# Patient Record
Sex: FEMALE | Race: WHITE | Employment: FULL TIME
[De-identification: names, ages, dates, MRNs, and addresses within clinical notes are randomized per-mention and may not be internally consistent; named-entity substitution may affect disease eponyms.]

---

## 2017-06-10 ENCOUNTER — HEALTH MAINTENANCE LETTER (OUTPATIENT)
Age: 27
End: 2017-06-10

## 2018-01-01 ENCOUNTER — HOSPITAL ENCOUNTER (EMERGENCY)
Facility: CLINIC | Age: 28
Discharge: HOME | End: 2018-01-01
Attending: EMERGENCY MEDICINE | Admitting: EMERGENCY MEDICINE
Payer: COMMERCIAL

## 2018-01-01 VITALS
TEMPERATURE: 98 F | RESPIRATION RATE: 16 BRPM | SYSTOLIC BLOOD PRESSURE: 125 MMHG | OXYGEN SATURATION: 98 % | DIASTOLIC BLOOD PRESSURE: 85 MMHG

## 2018-01-01 DIAGNOSIS — S01.81XA FACIAL LACERATION, INITIAL ENCOUNTER: ICD-10-CM
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
## 2018-01-01 PROCEDURE — 12011 RPR F/E/E/N/L/M 2.5 CM/<: CPT

## 2018-01-01 PROCEDURE — 25000128 H RX IP 250 OP 636: Performed by: EMERGENCY MEDICINE

## 2018-01-01 PROCEDURE — 99283 EMERGENCY DEPT VISIT LOW MDM: CPT | Mod: 25

## 2018-01-01 PROCEDURE — 90715 TDAP VACCINE 7 YRS/> IM: CPT | Performed by: EMERGENCY MEDICINE

## 2018-01-01 PROCEDURE — 90471 IMMUNIZATION ADMIN: CPT

## 2018-01-01 RX ORDER — BUPIVACAINE HYDROCHLORIDE 5 MG/ML
INJECTION, SOLUTION PERINEURAL
Status: DISCONTINUED
Start: 2018-01-01 | End: 2018-01-01 | Stop reason: HOSPADM

## 2018-01-01 RX ADMIN — CLOSTRIDIUM TETANI TOXOID ANTIGEN (FORMALDEHYDE INACTIVATED), CORYNEBACTERIUM DIPHTHERIAE TOXOID ANTIGEN (FORMALDEHYDE INACTIVATED), BORDETELLA PERTUSSIS TOXOID ANTIGEN (GLUTARALDEHYDE INACTIVATED), BORDETELLA PERTUSSIS FILAMENTOUS HEMAGGLUTININ ANTIGEN (FORMALDEHYDE INACTIVATED), BORDETELLA PERTUSSIS PERTACTIN ANTIGEN, AND BORDETELLA PERTUSSIS FIMBRIAE 2/3 ANTIGEN 0.5 ML: 5; 2; 2.5; 5; 3; 5 INJECTION, SUSPENSION INTRAMUSCULAR at 04:12

## 2018-01-01 ASSESSMENT — ENCOUNTER SYMPTOMS: WOUND: 1

## 2018-01-01 NOTE — ED AVS SNAPSHOT
[facility] Emergency Department    [address]    [facility] [address]    Phone:  [phone]    Fax:  [phone]                                       Cristina Sorto   MRN: [id number]    Department:  [facility] Emergency Department   Date of Visit:  1/1/2018           Patient Information     Date Of Birth          1990        Your diagnoses for this visit were:     Facial laceration, initial encounter     Dog bite, initial encounter        You were seen by Alan Barbosa MD.      Follow-up Information     Follow up with Primary doctor in 4-5 days for suture removal.        Discharge Instructions       Discharge Instructions  Laceration (Cut)    You were seen today for a laceration (cut).  Your provider examined your laceration for any problems such a buried foreign body (like glass, a splinter, or gravel), or injury to blood vessels, tendons, and nerves.  Your provider may have also rinsed and/or scrubbed your laceration to help prevent an infection. It may not be possible to find all problems with your laceration on the first visit; occasionally foreign bodies or a tendon injury can go undetected.    Your laceration may have been closed in one of several ways:    No closure: many wounds will heal just fine without closure.    Stitches: regular stitches that require removal.    Staples: skin staples are often used in the scalp/head.    Wound adhesive (glue): skin glue can be used for certain lacerations and doesn t require removal.    Wound strips (aka Butterfly bandages or steri-strips): these are bandages that help to close a wound.    Absorbable stitches:  dissolving  stitches that go away on their own and usually don t require removal.    A small percentage of wounds will develop an infection regardless of how well the wound is cared for. Antibiotics are generally not indicated to prevent an infection so are only given for a small number of high-risk  wounds. Some lacerations are too high risk to close, and are left open to heal because closure can increase the likelihood that an infection will develop.    Remember that all lacerations, no matter how expertly repaired, will cause scarring. We consider many factors, techniques, and materials, in our efforts to provide the best possible cosmetic outcome.    Generally, every Emergency Department visit should have a follow-up clinic visit with either a primary or a specialty clinic/provider. Please follow-up as instructed by your emergency provider today.     Return to the Emergency Department right away if:    You have more redness, swelling, pain, drainage (pus), a bad smell, or red streaking from your laceration as these symptoms could indicate an infection.    You have a fever of 100.4 F or more.    You have bleeding that you cannot stop at home. If your cut starts to bleed, hold pressure on the bleeding area with a clean cloth or put pressure over the bandage.  If the bleeding does not stop after using constant pressure for 30 minutes, you should return to the Emergency Department for further treatment.    An area past the laceration is cool, pale, or blue compared with the other side, or has a slower return of color when squeezed.    Your dressing seems too tight or starts to get uncomfortable or painful. For children, signs of a problem might be irritability or restlessness.    You have loss of normal function or use of an area, such as being unable to straighten or bend a finger normally.    You have a numb area past the laceration.    Return to the Emergency Department or see your regular provider if:    The laceration starts to come open.     You have something coming out of the cut or a feeling that there is something in the laceration.    Your wound will not heal, or keeps breaking open. There can always be glass, wood, dirt or other things in any wound.  They will not always show up, even on x-rays.  If  a wound does not heal, this may be why, and it is important to follow-up with your regular provider.    Home Care:    Take your dressing off in 12-24 hours, or as instructed by your provider, to check your laceration. Remove the dressing sooner if it seems too tight or painful, or if it is getting numb, tingly, or pale past the dressing.    Gently wash your laceration 1-2 times daily with clean water and mild soap. It is okay to shower or run clean water over the laceration, but do not let the laceration soak in water (no swimming).    If your laceration was closed with wound adhesive or strips: pat it dry and leave it open to the air. For all other repairs: after you wash your laceration, or at least 2 times a day, apply antibiotic ointment (such as Neosporin  or Bacitracin ) to the laceration, then cover it with a Band-Aid  or gauze.    Keep the laceration clean. Wear gloves or other protective clothing if you are around dirt.    Follow-up for removal:    If your wound was closed with staples or regular stitches, they need to be removed according to the instructions and timeline specified by your provider today.    If your wound was closed with absorbable ( dissolving ) sutures, they should fall out, dissolve, or not be visible in about one week. If they are still visible, then they should be removed according to the instructions and timeline specified by your provider today.    Scars:  To help minimize scarring:    Wear sunscreen over the healed laceration when out in the sun.    Massage the area regularly once healed.    You may apply Vitamin E to the healed wound.    Wait. Scars improve in appearance over months and years.    If you were given a prescription for medicine here today, be sure to read all of the information (including the package insert) that comes with your prescription.  This will include important information about the medicine, its side effects, and any warnings that you need to know about.   The pharmacist who fills the prescription can provide more information and answer questions you may have about the medicine.  If you have questions or concerns that the pharmacist cannot address, please call or return to the Emergency Department.       Remember that you can always come back to the Emergency Department if you are not able to see your regular provider in the amount of time listed above, if you get any new symptoms, or if there is anything that worries you.        Dog Bite  A dog bite can cause a wound deep enough to break the skin. In such cases, the wound is cleaned and sometimes closed. If the wound is closed, it is usually not completely closed. This is so that fluid can drain if the wound becomes infected. Often, wounds will be left open to heal. In addition to wound care, a tetanus shot may be given, if needed.    Home care    Wash your hands well with soap and warm water before and after caring for the wound. This helps lower the risk of infection.    Care for the wound as directed. If a dressing was applied to the wound, be sure to change it as directed.    If the wound bleeds, place a clean, soft cloth on the wound. Then firmly apply pressure until the bleeding stops. This may take up to 5 minutes. Do not release the pressure and look at the wound during this time.    Most wounds heal within 10 days. But an infection can occur even with proper treatment. So be sure to check the wound daily for signs of infection (see below).    Antibiotics may be prescribed. These help prevent or treat infection. If you re given antibiotics, take them as directed. Also be sure to complete the medicines.  Rabies prevention  Rabies is a virus that can be carried in certain animals. These can include domestic animals such as dogs and cats. Pets fully vaccinated against rabies (2 shots) are at very low risk of infection. But because human rabies is almost always fatal, any biting pet should be confined for 10 days  as an extra precaution. In general, if there is a risk for rabies, the following steps may need to be taken:    If someone s pet dog has bitten you, it should be kept in a secure area for the next 10 days to watch for signs of illness. (If the pet owner won t allow this, contact your local animal control center.) If the dog becomes ill or dies during that time, contact your local animal control center at once so the animal may be tested for rabies. If the dog stays healthy for the next 10 days, there is no danger of rabies in the animal or you.    If a stray dog bit you, contact your local animal control center. They can give information on capture, quarantine, and animal rabies testing.    If you can t find the animal that bit you in the next 2 days, and if rabies exists in your area, you may need to receive the rabies vaccine series. Call your healthcare provider right away. Or, return to the emergency department promptly.    All animal bites should be reported to the local animal control center. If you were not given a form to fill out, you can report this yourself.  Follow-up care  Follow up with your healthcare provider, or as directed.  When to seek medical advice  Call your healthcare provider right away if any of these occur:    Signs of infection:    Spreading redness or warmth from the wound    Increased pain or swelling    Fever of 100.4 F (38 C) or higher, or as directed by your healthcare provider    Colored fluid or pus draining from the wound    Signs of rabies infection:    Headache    Confusion    Strange behavior    Increased salivating and drooling    Seizure    Decreased ability to move any body part near the wound    Bleeding that can't be stopped after 5 minutes of firm pressure  Date Last Reviewed: 3/1/2017    5340-0117 [employer]. [address] [place] [address]. All rights reserved. This information is not intended as a substitute for professional medical care.  Always follow your healthcare professional's instructions.          24 Hour Appointment Hotline       To make an appointment at any [facility], call [phone] ([phone]). If you don't have a family doctor or clinic, we will help you find one. [facility] clinics are conveniently located to serve the needs of you and your family.             Review of your medicines      START taking        Dose / Directions Last dose taken    amoxicillin-clavulanate 875-125 MG per tablet   Commonly known as:  AUGMENTIN   Dose:  1 tablet   Quantity:  10 tablet        Take 1 tablet by mouth 2 times daily for 5 days   Refills:  0          Our records show that you are taking the medicines listed below. If these are incorrect, please call your family doctor or clinic.        Dose / Directions Last dose taken    levonorgestrel-ethinyl estradiol 0.1-20 MG-MCG per tablet   Commonly known as:  AVIANE,ALESSE,LESSINA   Dose:  1 tablet   Quantity:  1 Package        Take 1 tablet by mouth daily   Refills:  0        SUMAtriptan 50 MG tablet   Commonly known as:  IMITREX   Quantity:  30 tablet        1 at start of migraine ,may repeat in 1 hr   Refills:  3                Prescriptions were sent or printed at these locations (1 Prescription)                   Other Prescriptions                Printed at Department/Unit printer (1 of 1)         amoxicillin-clavulanate (AUGMENTIN) 875-125 MG per tablet                Orders Needing Specimen Collection     None      Pending Results     No orders found from 12/30/2017 to 1/2/2018.            Pending Culture Results     No orders found from 12/30/2017 to 1/2/2018.            Pending Results Instructions     If you had any lab results that were not finalized at the time of your Discharge, you can call the ED Lab Result RN at [phone]. You will be contacted by this team for any positive Lab results or changes in treatment. The nurses are available 7 days a week from 10A to 6:30P.  You  can leave a message 24 hours per day and they will return your call.        Test Results From Your Hospital Stay               Clinical Quality Measure: Blood Pressure Screening     Your blood pressure was checked while you were in the emergency department today. The last reading we obtained was  BP: 125/85 . Please read the guidelines below about what these numbers mean and what you should do about them.  If your systolic blood pressure (the top number) is less than 120 and your diastolic blood pressure (the bottom number) is less than 80, then your blood pressure is normal. There is nothing more that you need to do about it.  If your systolic blood pressure (the top number) is 120-139 or your diastolic blood pressure (the bottom number) is 80-89, your blood pressure may be higher than it should be. You should have your blood pressure rechecked within a year by a primary care provider.  If your systolic blood pressure (the top number) is 140 or greater or your diastolic blood pressure (the bottom number) is 90 or greater, you may have high blood pressure. High blood pressure is treatable, but if left untreated over time it can put you at risk for heart attack, stroke, or kidney failure. You should have your blood pressure rechecked by a primary care provider within the next 4 weeks.  If your provider in the emergency department today gave you specific instructions to follow-up with your doctor or provider even sooner than that, you should follow that instruction and not wait for up to 4 weeks for your follow-up visit.        Thank you for choosing [facility]       Thank you for choosing [facility] for your care. Our goal is always to provide you with excellent care. Hearing back from our patients is one way we can continue to improve our services. Please take a few minutes to complete the written survey that you may receive in the mail after you visit with us. Thank you!        [employer] Information     [employer] gives you  secure access to your electronic health record. If you see a primary care provider, you can also send messages to your care team and make appointments. If you have questions, please call your primary care clinic.  If you do not have a primary care provider, please call [phone] and they will assist you.        Care EveryWhere ID     This is your Care EveryWhere ID. This could be used by other organizations to access your [facility] medical records  [id number]        Equal Access to Services     AIDAN CAZARES : Hadii panfilo méndezo Soalejandra, wateeda lusissyadaha, qaybta kaalmaevangelina ladd, av baugh. So [facility] [phone].    ATENCIÓN: Si habla español, tiene a rice disposición servicios gratuitos de asistencia lingüística. Llame al [phone].    We comply with applicable federal civil rights laws and [place] laws. We do not discriminate on the basis of race, color, national origin, age, disability, sex, sexual orientation, or gender identity.            After Visit Summary       This is your record. Keep this with you and show to your community pharmacist(s) and doctor(s) at your next visit.

## 2018-01-01 NOTE — ED NOTES
Patient states she was letting friends dog out when dog bite top lip, bleeding controlled   ABC intact

## 2018-01-01 NOTE — ED PROVIDER NOTES
History     Chief Complaint:  Dog bite    HPI   Cristina Sorto is a 27 year old female who presents with a dog bite. The patient reports that she was bit on the upper lip by a friend's dog approximately two hours ago. Upon arrival here in the ED she denies any dental problems or other injuries aside from some swelling around the laceration.    Allergies:  No Known Drug Allergies     Medications:    Imitrex  Aviane    Past Medical History:    Melanoma in situ    Past Surgical History:    History reviewed. No pertinent past surgical history.    Family History:    Neurologic disorder  Lupus  Melanoma  Arthritis    Social History:  Smoking Status: No  Smokeless Tobacco: No  Alcohol Use: Yes  Marital Status:       Review of Systems   Skin: Positive for wound.   All other systems reviewed and are negative.    Physical Exam   Vitals:  Patient Vitals for the past 24 hrs:   BP Temp Temp src Heart Rate Resp SpO2   01/01/18 0206 125/85 - - - - -   01/01/18 0204 - 98  F (36.7  C) Temporal 91 16 98 %     Physical Exam  Constitutional: Alert, attentive  HENT: 1.5 cm laceration to the upper lip, just right of midline, involving the vermilion border, not through and through; no dental tenderness or laxity   Nose: Nose normal.    Mouth/Throat: Oropharynx is clear, mucous membranes are moist   Eyes: EOM are normal. Pupils are equal, round, and reactive to light.   CV: regular rate and rhythm; no murmurs, rubs or gallups  Chest: Effort normal and breath sounds normal.   GI:  There is no tenderness. No distension. Normal bowel sounds  MSK: Normal range of motion.   Neurological: Alert, attentive  Skin: Skin is warm and dry.    Emergency Department Course     Procedures:     Laceration Repair      LACERATION:  A simple clean 1.5 cm laceration.    LOCATION:  Upper lip.    FUNCTION:  Surrounding sensation and circulation are intact.    ANESTHESIA:  Regional block using bupivacaine 0.5% total of 3 mLs.    PREPARATION:   Irrigation and Scrubbing with Shur Clens.    DEBRIDEMENT:  no debridement.    CLOSURE:  Wound was closed with One Layer.  Skin closed with 4 x 6.0 Ethilon using interrupted sutures.    Interventions:  0412 Adacel, 0.5 mL, Intramuscular     Emergency Department Course:  Nursing notes and vitals reviewed.  0326 I had my initial encounter with the patient.  I performed an exam of the patient as documented above.   0417 I performed the procedure as noted above.    0433 I discussed the treatment plan with the patient. They expressed understanding of this plan and consented to discharge. They will be discharged home with instructions for care and follow up. In addition, the patient will return to the emergency department if their symptoms persist, worsen, if new symptoms arise or if there is any concern.  All questions were answered.    Impression & Plan      Medical Decision Making:  This is a very pleasant 27 year old female who presented with a laceration to the upper lip from a dog bite.  The wound was carefully evaluated and explored.  We discussed the risks of infection given the dog bite, as well as cosmetic concerns of not closing the wound.  After shared decision-making conversation, we opted to close the wound.  The laceration was closed with ethilon as noted herein.  There is no evidence of deeper structure injury with this laceration.  Possible complications (infection, scarring) were reviewed with the patient.  Follow up with primary care will be indicated for suture removal in 4-5 days.  Augmentin prophylaxis will be prescribed.  Tetanus is updated. I advised strict return precautions for pain, redness or drainage to the site, bleeding, or any other concerning symptoms.        Diagnosis:    ICD-10-CM    1. Facial laceration, initial encounter S01.81XA    2. Dog bite, initial encounter W54.0XXA      Disposition:   Discharge    Discharge Medications:  New Prescriptions    AMOXICILLIN-CLAVULANATE (AUGMENTIN)  875-125 MG PER TABLET    Take 1 tablet by mouth 2 times daily for 5 days     Scribe Disclosure:  I, Thomas Berumen, am serving as a scribe at 3:27 AM on 1/1/2018 to document services personally performed by Alan Barbosa MD, based on my observations and the provider's statements to me.    1/1/2018   [facility] EMERGENCY DEPARTMENT       Alan Barbosa MD  01/01/18 0608

## 2018-01-01 NOTE — DISCHARGE INSTRUCTIONS
Discharge Instructions  Laceration (Cut)    You were seen today for a laceration (cut).  Your provider examined your laceration for any problems such a buried foreign body (like glass, a splinter, or gravel), or injury to blood vessels, tendons, and nerves.  Your provider may have also rinsed and/or scrubbed your laceration to help prevent an infection. It may not be possible to find all problems with your laceration on the first visit; occasionally foreign bodies or a tendon injury can go undetected.    Your laceration may have been closed in one of several ways:    No closure: many wounds will heal just fine without closure.    Stitches: regular stitches that require removal.    Staples: skin staples are often used in the scalp/head.    Wound adhesive (glue): skin glue can be used for certain lacerations and doesn t require removal.    Wound strips (aka Butterfly bandages or steri-strips): these are bandages that help to close a wound.    Absorbable stitches:  dissolving  stitches that go away on their own and usually don t require removal.    A small percentage of wounds will develop an infection regardless of how well the wound is cared for. Antibiotics are generally not indicated to prevent an infection so are only given for a small number of high-risk wounds. Some lacerations are too high risk to close, and are left open to heal because closure can increase the likelihood that an infection will develop.    Remember that all lacerations, no matter how expertly repaired, will cause scarring. We consider many factors, techniques, and materials, in our efforts to provide the best possible cosmetic outcome.    Generally, every Emergency Department visit should have a follow-up clinic visit with either a primary or a specialty clinic/provider. Please follow-up as instructed by your emergency provider today.     Return to the Emergency Department right away if:    You have more redness, swelling, pain, drainage  (pus), a bad smell, or red streaking from your laceration as these symptoms could indicate an infection.    You have a fever of 100.4 F or more.    You have bleeding that you cannot stop at home. If your cut starts to bleed, hold pressure on the bleeding area with a clean cloth or put pressure over the bandage.  If the bleeding does not stop after using constant pressure for 30 minutes, you should return to the Emergency Department for further treatment.    An area past the laceration is cool, pale, or blue compared with the other side, or has a slower return of color when squeezed.    Your dressing seems too tight or starts to get uncomfortable or painful. For children, signs of a problem might be irritability or restlessness.    You have loss of normal function or use of an area, such as being unable to straighten or bend a finger normally.    You have a numb area past the laceration.    Return to the Emergency Department or see your regular provider if:    The laceration starts to come open.     You have something coming out of the cut or a feeling that there is something in the laceration.    Your wound will not heal, or keeps breaking open. There can always be glass, wood, dirt or other things in any wound.  They will not always show up, even on x-rays.  If a wound does not heal, this may be why, and it is important to follow-up with your regular provider.    Home Care:    Take your dressing off in 12-24 hours, or as instructed by your provider, to check your laceration. Remove the dressing sooner if it seems too tight or painful, or if it is getting numb, tingly, or pale past the dressing.    Gently wash your laceration 1-2 times daily with clean water and mild soap. It is okay to shower or run clean water over the laceration, but do not let the laceration soak in water (no swimming).    If your laceration was closed with wound adhesive or strips: pat it dry and leave it open to the air. For all other repairs:  after you wash your laceration, or at least 2 times a day, apply antibiotic ointment (such as Neosporin  or Bacitracin ) to the laceration, then cover it with a Band-Aid  or gauze.    Keep the laceration clean. Wear gloves or other protective clothing if you are around dirt.    Follow-up for removal:    If your wound was closed with staples or regular stitches, they need to be removed according to the instructions and timeline specified by your provider today.    If your wound was closed with absorbable ( dissolving ) sutures, they should fall out, dissolve, or not be visible in about one week. If they are still visible, then they should be removed according to the instructions and timeline specified by your provider today.    Scars:  To help minimize scarring:    Wear sunscreen over the healed laceration when out in the sun.    Massage the area regularly once healed.    You may apply Vitamin E to the healed wound.    Wait. Scars improve in appearance over months and years.    If you were given a prescription for medicine here today, be sure to read all of the information (including the package insert) that comes with your prescription.  This will include important information about the medicine, its side effects, and any warnings that you need to know about.  The pharmacist who fills the prescription can provide more information and answer questions you may have about the medicine.  If you have questions or concerns that the pharmacist cannot address, please call or return to the Emergency Department.       Remember that you can always come back to the Emergency Department if you are not able to see your regular provider in the amount of time listed above, if you get any new symptoms, or if there is anything that worries you.        Dog Bite  A dog bite can cause a wound deep enough to break the skin. In such cases, the wound is cleaned and sometimes closed. If the wound is closed, it is usually  not completely closed. This is so that fluid can drain if the wound becomes infected. Often, wounds will be left open to heal. In addition to wound care, a tetanus shot may be given, if needed.    Home care    Wash your hands well with soap and warm water before and after caring for the wound. This helps lower the risk of infection.    Care for the wound as directed. If a dressing was applied to the wound, be sure to change it as directed.    If the wound bleeds, place a clean, soft cloth on the wound. Then firmly apply pressure until the bleeding stops. This may take up to 5 minutes. Do not release the pressure and look at the wound during this time.    Most wounds heal within 10 days. But an infection can occur even with proper treatment. So be sure to check the wound daily for signs of infection (see below).    Antibiotics may be prescribed. These help prevent or treat infection. If you re given antibiotics, take them as directed. Also be sure to complete the medicines.  Rabies prevention  Rabies is a virus that can be carried in certain animals. These can include domestic animals such as dogs and cats. Pets fully vaccinated against rabies (2 shots) are at very low risk of infection. But because human rabies is almost always fatal, any biting pet should be confined for 10 days as an extra precaution. In general, if there is a risk for rabies, the following steps may need to be taken:    If someone s pet dog has bitten you, it should be kept in a secure area for the next 10 days to watch for signs of illness. (If the pet owner won t allow this, contact your local animal control center.) If the dog becomes ill or dies during that time, contact your local animal control center at once so the animal may be tested for rabies. If the dog stays healthy for the next 10 days, there is no danger of rabies in the animal or you.    If a stray dog bit you, contact your local animal control center. They can give information on  capture, quarantine, and animal rabies testing.    If you can t find the animal that bit you in the next 2 days, and if rabies exists in your area, you may need to receive the rabies vaccine series. Call your healthcare provider right away. Or, return to the emergency department promptly.    All animal bites should be reported to the local animal control center. If you were not given a form to fill out, you can report this yourself.  Follow-up care  Follow up with your healthcare provider, or as directed.  When to seek medical advice  Call your healthcare provider right away if any of these occur:    Signs of infection:    Spreading redness or warmth from the wound    Increased pain or swelling    Fever of 100.4 F (38 C) or higher, or as directed by your healthcare provider    Colored fluid or pus draining from the wound    Signs of rabies infection:    Headache    Confusion    Strange behavior    Increased salivating and drooling    Seizure    Decreased ability to move any body part near the wound    Bleeding that can't be stopped after 5 minutes of firm pressure  Date Last Reviewed: 3/1/2017    7364-9562 [employer]. [address] [place] [address], [place] [address]. All rights reserved. This information is not intended as a substitute for professional medical care. Always follow your healthcare professional's instructions.

## 2018-01-01 NOTE — ED AVS SNAPSHOT
[facility] Emergency Department    [address]    [facility] [address]    Phone:  [phone]    Fax:  [phone]                                       Cristina Sorto   MRN: [id number]    Department:  [facility] Emergency Department   Date of Visit:  1/1/2018           After Visit Summary Signature Page     I have received my discharge instructions, and my questions have been answered. I have discussed any challenges I see with this plan with the nurse or doctor.    ..........................................................................................................................................  Patient/Patient Representative Signature      ..........................................................................................................................................  Patient Representative Print Name and Relationship to Patient    ..................................................               ................................................  Date                                            Time    ..........................................................................................................................................  Reviewed by Signature/Title    ...................................................              ..............................................  Date                                                            Time

## 2018-01-05 ENCOUNTER — OFFICE VISIT (OUTPATIENT)
Dept: FAMILY MEDICINE | Facility: CLINIC | Age: 28
End: 2018-01-05
Payer: COMMERCIAL

## 2018-01-05 VITALS
SYSTOLIC BLOOD PRESSURE: 118 MMHG | OXYGEN SATURATION: 100 % | HEIGHT: 67 IN | TEMPERATURE: 98.9 F | DIASTOLIC BLOOD PRESSURE: 78 MMHG | HEART RATE: 78 BPM | WEIGHT: 151 LBS | RESPIRATION RATE: 16 BRPM | BODY MASS INDEX: 23.7 KG/M2

## 2018-01-05 DIAGNOSIS — Z48.02 VISIT FOR SUTURE REMOVAL: Primary | ICD-10-CM

## 2018-01-05 PROCEDURE — 99213 OFFICE O/P EST LOW 20 MIN: CPT | Performed by: FAMILY MEDICINE

## 2018-01-05 NOTE — PROGRESS NOTES
"  SUBJECTIVE:   Cristina Sorto is a 27 year old female who presents to clinic today for the following health issues:        ED/UC Followup:    Facility:  [facility]  Date of visit: 01/01/2018  Reason for visit: Dog bite  Current Status: SR     Cristina Sorto is a 27 year old female who presents today with the need to remove stiches placed in the ED on 1/1/2018 after sustaining an injury to her lip from a dog bite.   She was bitten on the upper lip by a friend's dog.    Problem list and histories reviewed & adjusted, as indicated.  Additional history: as documented    Labs reviewed in EPIC    Reviewed and updated as needed this visit by clinical staff  Tobacco  Allergies  Meds  Med Hx  Surg Hx  Fam Hx  Soc Hx      Reviewed and updated as needed this visit by Provider         ROS:  C: NEGATIVE for fever, chills  INTEGUMENTARY/SKIN: POSITIVE for stiches on upper lip  E: NEGATIVE for vision changes or irritation  E/M: NEGATIVE for ear or throat problems  R: NEGATIVE for significant cough or SOB  CV: NEGATIVE for chest pain, palpitations or peripheral edema  M: NEGATIVE for significant arthralgias or myalgia  H: NEGATIVE for bleeding problems    OBJECTIVE:     /78 (BP Location: Right arm, Patient Position: Sitting, Cuff Size: Adult Regular)  Pulse 78  Temp 98.9  F (37.2  C) (Tympanic)  Resp 16  Ht 5' 7.25\" (1.708 m)  Wt 151 lb (68.5 kg)  LMP 12/12/2017 (Approximate)  SpO2 100%  Breastfeeding? No  BMI 23.47 kg/m2  Body mass index is 23.47 kg/(m^2).   GENERAL: healthy, alert and no distress  EYES: Eyes grossly normal to inspection, PERRL and conjunctivae and sclerae normal  HENT: normal cephalic/atraumatic, ear canals and TM's normal, oropharynx clear and oral mucous membranes moist.   Well-healing vertical laceration (1.5 cm in length with 4 simple interrupted sutures) involving the vermilion border of the upper middle lip.  NECK: no adenopathy, no asymmetry, masses, or scars and thyroid normal to " palpation  RESP: lungs clear to auscultation - no rales, rhonchi or wheezes  CV: regular rate and rhythm, normal S1 S2, no S3 or S4, no murmur, click or rub, no peripheral edema and peripheral pulses strong  MS: no gross musculoskeletal defects noted, no edema  PSYCH: mentation appears normal, affect normal/bright    Diagnostic Test Results:  none     ASSESSMENT/PLAN:     Problem List Items Addressed This Visit     None      Visit Diagnoses     Visit for suture removal    -  Primary         Cristina presents for suture removal of 4 simple-interrupted sutures placed on 1/1/18 after she sustained a dog-bite on her upper lip (through the vermilion border). The wound is well healed without signs of infection.  May use Tylenol/NSAIDs prn.  Discussed wound care.  Recommended to apply Vaseline regularly, avoid wearing make-up near the laceration until it has completely healed and monitor for infection.  The sutures were removed without complication and in the sterile fashion. .  Will have patient return prn.    Sara Stout, DO  [facility]

## 2018-01-05 NOTE — MR AVS SNAPSHOT
After Visit Summary   1/5/2018    Cristina Sorto    MRN: [id number]           Patient Information     Date Of Birth          1990        Visit Information        Provider Department      1/5/2018 1:10 PM Sara Stout DO [facility]        Care Instructions      Laceration: How to Minimize Scarring  A laceration is a cut through one or more layers of the skin. Cuts heal as the edges of the cut grow together and heal. After the cut heals, the skin may not look exactly the same. The desire left behind is called a scar. Scars are a natural part of the healing process. They are hard to avoid. How much you may scar depends on the depth of the cut, its location on your body, your age, and how your skin heals. Some people tend to heal with more scarring than others. Most scars fade and become less noticeable over time. You can take steps to help this process along.  NOTE: Please inform the staff of your last tetanus shot and if your wound was caused by a julianna or dirty object.  What you can do  The tips below can help reduce scarring as your wound heals.    Keep the wound clean. Unless you are told to keep the area dry, gently wash the area with mild soap and water. You don't need to use antibacterial soap.     Keep the wound moist. Apply petroleum jelly to the wound to keep it moist and prevent a scab from forming. Scabs lengthen the time it takes a wound to heal.    Cover the wound. Use a non-adhesive bandage or gauze pad with paper tape. Change the bandage daily or if it gets wet or dirty.    Try hydrating or silicone gel sheets. These dressings keep the wound moist and may help it heal faster with less scarring. They may be useful for larger wounds, scrapes, sores, burns, or wounds with persistent redness. Ask your healthcare provider whether you should use this product on your wound.    If you have stitches (sutures), follow your healthcare provider's  instructions. Care for the wound as instructed. Also, return to have stitches removed on time. If you wait, scarring might be worse.    Use sunscreen. Once the wound heals, apply sunscreen to the area daily. Sun may cause the scar to be more visible and discolored.  Follow up  Make a follow-up appointment as directed by our staff. If you are advised to see a specialist or you have concerns about scarring, please contact a dermatologist.  When to seek medical advice  When to seek medical advice    Call your health care provider right away if any of these occur:    Shaking chills or fever above 100.4 F (38 C)    Bleeding that soaks the dressing    Pink fluid weeping from the wound    Increased drainage from the wound or drainage that is yellow, yellow-green, or has a foul odor    Increased swelling, pain, or redness in the skin around the wound    A change in the color or size of the wound    Increased fatigue    Loss of appetite    Sutures pulling away from the wound or pulling apart  Date Last Reviewed: 11/15/2015    6243-1809 [employer]. [address] [place] [address]. All rights reserved. This information is not intended as a substitute for professional medical care. Always follow your healthcare professional's instructions.                Follow-ups after your visit        Follow-up notes from your care team     Return if symptoms worsen or fail to improve, for stiches.      Who to contact     If you have questions or need follow up information about today's clinic visit or your schedule please contact [facility] directly at [phone].  Normal or non-critical lab and imaging results will be communicated to you by MyChart, letter or phone within 4 business days after the clinic has received the results. If you do not hear from us within 7 days, please contact the clinic through MyChart or phone. If you have a critical or abnormal lab result, we will notify  "you by phone as soon as possible.  Submit refill requests through [employer] or call your pharmacy and they will forward the refill request to us. Please allow 3 business days for your refill to be completed.          Additional Information About Your Visit        [employer] Information     [employer] gives you secure access to your electronic health record. If you see a primary care provider, you can also send messages to your care team and make appointments. If you have questions, please call your primary care clinic.  If you do not have a primary care provider, please call [phone] and they will assist you.        Care EveryWhere ID     This is your Care EveryWhere ID. This could be used by other organizations to access your [place] medical records  [id number]        Your Vitals Were     Pulse Temperature Respirations Height Last Period Pulse Oximetry    78 98.9  F (37.2  C) (Tympanic) 16 5' 7.25\" (1.708 m) 12/12/2017 (Approximate) 100%    Breastfeeding? BMI (Body Mass Index)                No 23.47 kg/m2           Blood Pressure from Last 3 Encounters:   01/05/18 118/78   01/01/18 125/85   09/13/16 110/70    Weight from Last 3 Encounters:   01/05/18 151 lb (68.5 kg)   09/13/16 137 lb (62.1 kg)   11/08/13 158 lb (71.7 kg)              Today, you had the following     No orders found for display       Primary Care Provider Fax #    Physician No Ref-Primary [phone]       No address on file        Equal Access to Services     AIDAN CAZARES : Hadii panfilo [place], waaxda luqadaha, qaybta kaalmada adejasminyada, av pompa . So [facility] [phone].    ATENCIÓN: Si habla español, tiene a rice disposición servicios gratuitos de asistencia lingüística. Llame al [phone].    We comply with applicable federal civil rights laws and [place] laws. We do not discriminate on the basis of race, color, national origin, age, disability, sex, sexual orientation, or gender identity.          "   Thank you!     Thank you for choosing [facility]  for your care. Our goal is always to provide you with excellent care. Hearing back from our patients is one way we can continue to improve our services. Please take a few minutes to complete the written survey that you may receive in the mail after your visit with us. Thank you!             Your Updated Medication List - Protect others around you: Learn how to safely use, store and throw away your medicines at www.disposemymeds.org.          This list is accurate as of: 1/5/18  1:58 PM.  Always use your most recent med list.                   Brand Name Dispense Instructions for use Diagnosis    amoxicillin-clavulanate 875-125 MG per tablet    AUGMENTIN    10 tablet    Take 1 tablet by mouth 2 times daily for 5 days        levonorgestrel-ethinyl estradiol 0.1-20 MG-MCG per tablet    ARIANNA LEE LESSINA    1 Package    Take 1 tablet by mouth daily    Contraception management       SUMAtriptan 50 MG tablet    IMITREX    30 tablet    1 at start of migraine ,may repeat in 1 hr    Migraine with aura and without status migrainosus, not intractable

## 2018-01-05 NOTE — PATIENT INSTRUCTIONS
Laceration: How to Minimize Scarring  A laceration is a cut through one or more layers of the skin. Cuts heal as the edges of the cut grow together and heal. After the cut heals, the skin may not look exactly the same. The desire left behind is called a scar. Scars are a natural part of the healing process. They are hard to avoid. How much you may scar depends on the depth of the cut, its location on your body, your age, and how your skin heals. Some people tend to heal with more scarring than others. Most scars fade and become less noticeable over time. You can take steps to help this process along.  NOTE: Please inform the staff of your last tetanus shot and if your wound was caused by a julianna or dirty object.  What you can do  The tips below can help reduce scarring as your wound heals.    Keep the wound clean. Unless you are told to keep the area dry, gently wash the area with mild soap and water. You don't need to use antibacterial soap.     Keep the wound moist. Apply petroleum jelly to the wound to keep it moist and prevent a scab from forming. Scabs lengthen the time it takes a wound to heal.    Cover the wound. Use a non-adhesive bandage or gauze pad with paper tape. Change the bandage daily or if it gets wet or dirty.    Try hydrating or silicone gel sheets. These dressings keep the wound moist and may help it heal faster with less scarring. They may be useful for larger wounds, scrapes, sores, burns, or wounds with persistent redness. Ask your healthcare provider whether you should use this product on your wound.    If you have stitches (sutures), follow your healthcare provider's instructions. Care for the wound as instructed. Also, return to have stitches removed on time. If you wait, scarring might be worse.    Use sunscreen. Once the wound heals, apply sunscreen to the area daily. Sun may cause the scar to be more visible and discolored.  Follow up  Make a follow-up appointment as directed by our  staff. If you are advised to see a specialist or you have concerns about scarring, please contact a dermatologist.  When to seek medical advice  When to seek medical advice    Call your health care provider right away if any of these occur:    Shaking chills or fever above 100.4 F (38 C)    Bleeding that soaks the dressing    Pink fluid weeping from the wound    Increased drainage from the wound or drainage that is yellow, yellow-green, or has a foul odor    Increased swelling, pain, or redness in the skin around the wound    A change in the color or size of the wound    Increased fatigue    Loss of appetite    Sutures pulling away from the wound or pulling apart  Date Last Reviewed: 11/15/2015    4815-5012 [employer]. [address] [place] [address], [place] [address]. All rights reserved. This information is not intended as a substitute for professional medical care. Always follow your healthcare professional's instructions.

## 2018-03-18 ENCOUNTER — HOSPITAL ENCOUNTER (EMERGENCY)
Facility: CLINIC | Age: 28
Discharge: HOME | End: 2018-03-18
Attending: PHYSICIAN ASSISTANT | Admitting: PHYSICIAN ASSISTANT
Payer: COMMERCIAL

## 2018-03-18 VITALS
HEART RATE: 86 BPM | DIASTOLIC BLOOD PRESSURE: 98 MMHG | HEIGHT: 67 IN | OXYGEN SATURATION: 98 % | RESPIRATION RATE: 16 BRPM | SYSTOLIC BLOOD PRESSURE: 131 MMHG | WEIGHT: 161 LBS | TEMPERATURE: 99.5 F | BODY MASS INDEX: 25.27 KG/M2

## 2018-03-18 DIAGNOSIS — S91.311A FOOT LACERATION, RIGHT, INITIAL ENCOUNTER: ICD-10-CM

## 2018-03-18 PROCEDURE — 12001 RPR S/N/AX/GEN/TRNK 2.5CM/<: CPT

## 2018-03-18 PROCEDURE — 99283 EMERGENCY DEPT VISIT LOW MDM: CPT

## 2018-03-18 ASSESSMENT — ENCOUNTER SYMPTOMS: WOUND: 1

## 2018-03-18 NOTE — ED AVS SNAPSHOT
Emergency Department    [address] [place] [address]    Phone:  [phone]    Fax:  [phone]                                       Cristina Sorto   MRN: [id number]    Department:   Emergency Department   Date of Visit:  3/18/2018           After Visit Summary Signature Page     I have received my discharge instructions, and my questions have been answered. I have discussed any challenges I see with this plan with the nurse or doctor.    ..........................................................................................................................................  Patient/Patient Representative Signature      ..........................................................................................................................................  Patient Representative Print Name and Relationship to Patient    ..................................................               ................................................  Date                                            Time    ..........................................................................................................................................  Reviewed by Signature/Title    ...................................................              ..............................................  Date                                                            Time

## 2018-03-18 NOTE — ED AVS SNAPSHOT
Emergency Department    [address] [facility] [address]    Phone:  [phone]    Fax:  [phone]                                       Cristina Sorto   MRN: [id number]    Department:   Emergency Department   Date of Visit:  3/18/2018           Patient Information     Date Of Birth          1990        Your diagnoses for this visit were:     Foot laceration, right, initial encounter        You were seen by Larissa Berry PA-C.      Follow-up Information     Follow up with No Ref-Primary, Physician In 7 days.    Why:  For suture removal        Follow up with  Emergency Department.    Specialty:  EMERGENCY MEDICINE    Why:  If symptoms worsen    Contact information:    [address] [facility] [address] [phone]        Discharge Instructions       Discharge Instructions  Laceration (Cut)    You were seen today for a laceration (cut).  Your provider examined your laceration for any problems such a buried foreign body (like glass, a splinter, or gravel), or injury to blood vessels, tendons, and nerves.  Your provider may have also rinsed and/or scrubbed your laceration to help prevent an infection. It may not be possible to find all problems with your laceration on the first visit; occasionally foreign bodies or a tendon injury can go undetected.    Your laceration may have been closed in one of several ways:    No closure: many wounds will heal just fine without closure.    Stitches: regular stitches that require removal.    Staples: skin staples are often used in the scalp/head.    Wound adhesive (glue): skin glue can be used for certain lacerations and doesn t require removal.    Wound strips (aka Butterfly bandages or steri-strips): these are bandages that help to close a wound.    Absorbable stitches:  dissolving  stitches that go away on their own and usually don t require removal.    A small percentage of wounds will develop an infection regardless  of how well the wound is cared for. Antibiotics are generally not indicated to prevent an infection so are only given for a small number of high-risk wounds. Some lacerations are too high risk to close, and are left open to heal because closure can increase the likelihood that an infection will develop.    Remember that all lacerations, no matter how expertly repaired, will cause scarring. We consider many factors, techniques, and materials, in our efforts to provide the best possible cosmetic outcome.    Generally, every Emergency Department visit should have a follow-up clinic visit with either a primary or a specialty clinic/provider. Please follow-up as instructed by your emergency provider today.     Return to the Emergency Department right away if:    You have more redness, swelling, pain, drainage (pus), a bad smell, or red streaking from your laceration as these symptoms could indicate an infection.    You have a fever of 100.4 F or more.    You have bleeding that you cannot stop at home. If your cut starts to bleed, hold pressure on the bleeding area with a clean cloth or put pressure over the bandage.  If the bleeding does not stop after using constant pressure for 30 minutes, you should return to the Emergency Department for further treatment.    An area past the laceration is cool, pale, or blue compared with the other side, or has a slower return of color when squeezed.    Your dressing seems too tight or starts to get uncomfortable or painful. For children, signs of a problem might be irritability or restlessness.    You have loss of normal function or use of an area, such as being unable to straighten or bend a finger normally.    You have a numb area past the laceration.    Return to the Emergency Department or see your regular provider if:    The laceration starts to come open.     You have something coming out of the cut or a feeling that there is something in the laceration.    Your wound will not  heal, or keeps breaking open. There can always be glass, wood, dirt or other things in any wound.  They will not always show up, even on x-rays.  If a wound does not heal, this may be why, and it is important to follow-up with your regular provider.    Home Care:    Take your dressing off in 12-24 hours, or as instructed by your provider, to check your laceration. Remove the dressing sooner if it seems too tight or painful, or if it is getting numb, tingly, or pale past the dressing.    Gently wash your laceration 1-2 times daily with clean water and mild soap. It is okay to shower or run clean water over the laceration, but do not let the laceration soak in water (no swimming).    If your laceration was closed with wound adhesive or strips: pat it dry and leave it open to the air. For all other repairs: after you wash your laceration, or at least 2 times a day, apply antibiotic ointment (such as Neosporin  or Bacitracin ) to the laceration, then cover it with a Band-Aid  or gauze.    Keep the laceration clean. Wear gloves or other protective clothing if you are around dirt.    Follow-up for removal:    If your wound was closed with staples or regular stitches, they need to be removed according to the instructions and timeline specified by your provider today.    If your wound was closed with absorbable ( dissolving ) sutures, they should fall out, dissolve, or not be visible in about one week. If they are still visible, then they should be removed according to the instructions and timeline specified by your provider today.    Scars:  To help minimize scarring:    Wear sunscreen over the healed laceration when out in the sun.    Massage the area regularly once healed.    You may apply Vitamin E to the healed wound.    Wait. Scars improve in appearance over months and years.    If you were given a prescription for medicine here today, be sure to read all of the information (including the package insert) that comes  with your prescription.  This will include important information about the medicine, its side effects, and any warnings that you need to know about.  The pharmacist who fills the prescription can provide more information and answer questions you may have about the medicine.  If you have questions or concerns that the pharmacist cannot address, please call or return to the Emergency Department.       Remember that you can always come back to the Emergency Department if you are not able to see your regular provider in the amount of time listed above, if you get any new symptoms, or if there is anything that worries you.      24 Hour Appointment Hotline       To make an appointment at any [facility], call [phone] ([phone]). If you don't have a family doctor or clinic, we will help you find one. [facility] are conveniently located to serve the needs of you and your family.             Review of your medicines      Our records show that you are taking the medicines listed below. If these are incorrect, please call your family doctor or clinic.        Dose / Directions Last dose taken    levonorgestrel-ethinyl estradiol 0.1-20 MG-MCG per tablet   Commonly known as:  AVIANE,ALESSE,LESSINA   Dose:  1 tablet   Quantity:  1 Package        Take 1 tablet by mouth daily   Refills:  0        SUMAtriptan 50 MG tablet   Commonly known as:  IMITREX   Quantity:  30 tablet        1 at start of migraine ,may repeat in 1 hr   Refills:  3                Orders Needing Specimen Collection     None      Pending Results     No orders found from 3/16/2018 to 3/19/2018.            Pending Culture Results     No orders found from 3/16/2018 to 3/19/2018.            Pending Results Instructions     If you had any lab results that were not finalized at the time of your Discharge, you can call the ED Lab Result RN at [phone]. You will be contacted by this team for any positive Lab results or changes in treatment. The  nurses are available 7 days a week from 10A to 6:30P.  You can leave a message 24 hours per day and they will return your call.        Test Results From Your Hospital Stay               Clinical Quality Measure: Blood Pressure Screening     Your blood pressure was checked while you were in the emergency department today. The last reading we obtained was  BP: (!) 131/98 . Please read the guidelines below about what these numbers mean and what you should do about them.  If your systolic blood pressure (the top number) is less than 120 and your diastolic blood pressure (the bottom number) is less than 80, then your blood pressure is normal. There is nothing more that you need to do about it.  If your systolic blood pressure (the top number) is 120-139 or your diastolic blood pressure (the bottom number) is 80-89, your blood pressure may be higher than it should be. You should have your blood pressure rechecked within a year by a primary care provider.  If your systolic blood pressure (the top number) is 140 or greater or your diastolic blood pressure (the bottom number) is 90 or greater, you may have high blood pressure. High blood pressure is treatable, but if left untreated over time it can put you at risk for heart attack, stroke, or kidney failure. You should have your blood pressure rechecked by a primary care provider within the next 4 weeks.  If your provider in the emergency department today gave you specific instructions to follow-up with your doctor or provider even sooner than that, you should follow that instruction and not wait for up to 4 weeks for your follow-up visit.        Thank you for choosing [facility]       Thank you for choosing [facility] for your care. Our goal is always to provide you with excellent care. Hearing back from our patients is one way we can continue to improve our services. Please take a few minutes to complete the written survey that you may receive in the mail after you visit with  us. Thank you!        [employer] Information     "[employer] gives you secure access to your electronic health record. If you see a primary care provider, you can also send messages to your care team and make appointments. If you have questions, please call your primary care clinic.  If you do not have a primary care provider, please call [phone] and they will assist you.        Care EveryWhere ID     This is your Care EveryWhere ID. This could be used by other organizations to access your [facility] medical records  [id number]        Equal Access to Services     AIDAN CAZARES : Vicente edwards Soalejandra, waeric lusissyadaha, qahever kaalmaevangelina ladd, av pompa . So [facility] [phone].    ATENCIÓN: Si habla español, tiene a rice disposición servicios gratuitos de asistencia lingüística. Llame al [phone].    We comply with applicable federal civil rights laws and [place] laws. We do not discriminate on the basis of race, color, national origin, age, disability, sex, sexual orientation, or gender identity.            After Visit Summary       This is your record. Keep this with you and show to your community pharmacist(s) and doctor(s) at your next visit.

## 2018-03-19 NOTE — ED PROVIDER NOTES
"  History     Chief Complaint:  Laceration    The history is provided by the patient.      Cristina Sorto is a 27 year old female who presents for evaluation of a laceration. The patient's mother dropped a steak knife on the patient's right foot approximately 15 minutes ago while they were washing dishes. The patient immediately wrapped her wound in a towel and came to the ED. She has no other injuries.  She has not taken anything for pain.  She has no other medical concerns. Her last Tdap was in January.    Allergies:  No known drug allergies      Medications:    The patient is not currently taking any prescribed medications.     Past Medical History:    Melanoma in situ  Migraines    Past Surgical History:    History reviewed. No pertinent surgical history.     Family History:    Neurologic disorder  Melanoma  Gout  Lupus    Social History:  Presents with mother and spouse   Tobacco use: Never  Alcohol use: Occasional  PCP: Physician No Ref-Primary    Marital Status:        Review of Systems   Skin: Positive for wound.   All other systems reviewed and are negative.    Physical Exam     Patient Vitals for the past 24 hrs:   BP Temp Temp src Pulse Resp SpO2 Height Weight   03/18/18 2004 (!) 131/98 99.5  F (37.5  C) Oral 86 16 98 % 1.702 m (5' 7\") 73 kg (161 lb)      Physical Exam  General: Resting comfortably.  Alert and oriented.   Head:  The scalp, face, and head appear normal   CV:  Regular rate and rhythm     Normal S1/S2    No pathological murmur detected     DP and PT pulses intact.     Capillary refill is brisk.  Resp:  Lungs are clear to auscultation    Non-labored    No rales or wheezing   Skin:  0.3 cm superficial laceration to the dorsal aspect of the right foot with no bleeding currently.  Neuro:  Sensation intact in right foot.    Emergency Department Course   Procedures:     Laceration Repair      LACERATION:  A simple clean 0.3 cm laceration.    LOCATION:  Dorsal right foot.    FUNCTION:  " Distally sensation, circulation, motor and tendon function are intact.    PREPARATION:  Irrigation and Scrubbing with Normal Saline and Shur Clens.    DEBRIDEMENT:  no debridement and wound explored, no foreign body found.    CLOSURE:  Wound was closed with One Layer.  Skin closed with 1 x 5.0 Ethylon using interrupted sutures..       Emergency Department Course:  Past medical records, nursing notes, and vitals reviewed.  2010: I performed an exam of the patient and obtained history, as documented above.      2040: I rechecked the patient. I performed a laceration repair as noted above. Findings and plan explained to the Patient and family. Patient discharged home with instructions regarding supportive care, medications, and reasons to return. The importance of close follow-up was reviewed.      Impression & Plan    Medical Decision Making:  Cristina Sorto is a 27 year old female who presents for evaluation of a laceration.  Patient presents vitally stable and afebrile.  There is a very small superficial laceration noted to the dorsal aspect of the right foot. The wound was carefully evaluated and explored. Given the amount of blood described by the patient and family member and the amount of blood on the patient's foot, I believe the patient may have hit a superficial vein.  There is no evidence of ongoing bleeding.  Laceration was repaired as above.  Patient is up-to-date on her tetanus.  There is no evidence of muscular, tendon, or bony damage with this laceration.  No signs of foreign body. Possible complications (infection, scarring) were reviewed with the patient.  Patient will follow up with her primary care doctor in 7 to 10 days for suture removal.  She will return immediately to the ED if she develops fever, spreading redness, purulent drainage, or any other concerning symptoms.  All questions were answered prior to discharge.  The patient understands and agrees To this plan.    Diagnosis:    ICD-10-CM     1. Foot laceration, right, initial encounter S91.311A        Disposition:  Discharged to home with plan as outlined.      Nato Keith  3/18/2018    EMERGENCY DEPARTMENT  I, Nato Keith, am serving as a scribe at 8:10 PM on 3/18/2018 to document services personally performed by Larissa Berry PA-C based on my observations and the provider's statements to me.       Larissa Berry PA-C  03/18/18 0050

## 2018-06-11 NOTE — NURSING NOTE
"Chief Complaint   Patient presents with     ER F/U     Ridges laceration repair on 01/05/2017.       Initial /78 (BP Location: Right arm, Patient Position: Sitting, Cuff Size: Adult Regular)  Pulse 78  Temp 98.9  F (37.2  C) (Tympanic)  Resp 16  Ht 5' 7.25\" (1.708 m)  Wt 151 lb (68.5 kg)  LMP 12/12/2017 (Approximate)  SpO2 100%  Breastfeeding? No  BMI 23.47 kg/m2 Estimated body mass index is 23.47 kg/(m^2) as calculated from the following:    Height as of this encounter: 5' 7.25\" (1.708 m).    Weight as of this encounter: 151 lb (68.5 kg).  Medication Reconciliation: complete     Tamika Lazo LPN  " Complex Repair And Dorsal Nasal Flap Text: The defect edges were debeveled with a #15 scalpel blade.  The primary defect was closed partially with a complex linear closure.  Given the location of the remaining defect, shape of the defect and the proximity to free margins a dorsal nasal flap was deemed most appropriate for complete closure of the defect.  Using a sterile surgical marker, an appropriate flap was drawn incorporating the defect and placing the expected incisions within the relaxed skin tension lines where possible.    The area thus outlined was incised deep to adipose tissue with a #15 scalpel blade.  The skin margins were undermined to an appropriate distance in all directions utilizing iris scissors.

## 2018-08-06 ENCOUNTER — TELEPHONE (OUTPATIENT)
Dept: FAMILY MEDICINE | Facility: CLINIC | Age: 28
End: 2018-08-06

## 2018-08-06 NOTE — LETTER
August 6, 2018    Cristina Sorto  [address]  [facility] [address]    Dear Janiya Evans cares about your health and your health plan.  I have reviewed your medical conditions, medication list and lab results, and am making recommendations based on this review to better manage your health.    You are in particular need of attention regarding:  -Cervical Cancer Screening  -Wellness (Physical) Visit     I am recommending that you:     -schedule a WELLNESS (Physical) APPOINTMENT with me.   I will check fasting labs the same day - nothing to eat except water and meds for 8-10 hours prior. (If you go elsewhere for Wellness visits then please disregard this reminder.)    -schedule a PAP SMEAR EXAM which is due.  Please disregard this reminder if you have had this exam elsewhere within the last year.  It would be helpful for us to have a copy of your recent pap smear report in our file so that we can best coordinate your care.    If you are under/uninsured, we recommend you contact the [facility]. They offer pap smears at no charge or on a sliding fee charge. You can schedule with them at [phone]. Please have them send us the results.      Please call us at the [facility] location:  [phone] or use [employer] to address the above recommendations.     Thank you for trusting [facility].  We appreciate the opportunity to serve you and look forward to supporting your healthcare in the future.    If you have (or plan to have) any of these tests done at a facility other than a [facility] or a [facility], please have the results sent to the [facility] location noted above.      Best Regards,    Sara Stout, DO

## 2018-08-06 NOTE — TELEPHONE ENCOUNTER
Panel Management Review      Patient has the following on her problem list: None      Composite cancer screening  Chart review shows that this patient is due/due soon for the following Pap Smear  Summary:    Patient is due/failing the following:   PAP and PHYSICAL    Action needed:   Patient needs office visit for Physical/Pap.    Type of outreach:    Sent letter.    Questions for provider review:    None                                                                                                                                    Tamika Lazo LPN     Chart routed to Care Team .

## 2018-11-21 ENCOUNTER — TELEPHONE (OUTPATIENT)
Dept: FAMILY MEDICINE | Facility: CLINIC | Age: 28
End: 2018-11-21

## 2018-11-21 NOTE — LETTER
November 21, 2018    Cristina Sorto  [address]  [facility] [address]    Dear Janiya Evans cares about your health and your health plan.  I have reviewed your medical conditions, medication list and lab results, and am making recommendations based on this review to better manage your health.    You are in particular need of attention regarding:  -Cervical Cancer Screening  -Wellness (Physical) Visit     I am recommending that you:     -schedule a WELLNESS (Physical) APPOINTMENT with me.   I will check fasting labs the same day - nothing to eat except water and meds for 8-10 hours prior. (If you go elsewhere for Wellness visits then please disregard this reminder.)    -schedule a PAP SMEAR EXAM which is due.  Please disregard this reminder if you have had this exam elsewhere within the last year.  It would be helpful for us to have a copy of your recent pap smear report in our file so that we can best coordinate your care.    If you are under/uninsured, we recommend you contact the [facility]. They offer pap smears at no charge or on a sliding fee charge. You can schedule with them at [phone]. Please have them send us the results.      Please call us at the [facility] location:  [phone] or use [employer] to address the above recommendations.     Thank you for trusting [facility].  We appreciate the opportunity to serve you and look forward to supporting your healthcare in the future.    If you have (or plan to have) any of these tests done at a facility other than a [facility] or a [facility], please have the results sent to the [facility] location noted above.      Best Regards,    Sara Stout, DO

## 2018-11-21 NOTE — TELEPHONE ENCOUNTER
Panel Management Review      Patient has the following on her problem list: None      Composite cancer screening  Chart review shows that this patient is due/due soon for the following Pap Smear  Summary:    Patient is due/failing the following:   PAP and PHYSICAL    Action needed:   Patient needs office visit for Physical/Pap.    Type of outreach:    Sent letter.    Questions for provider review:    None                                                                                                                                    Tamika Lazo LPN     Chart routed to  .

## 2019-09-30 ENCOUNTER — HEALTH MAINTENANCE LETTER (OUTPATIENT)
Age: 29
End: 2019-09-30

## 2019-10-16 LAB
BLD GP AB SCN SERPL QL: NEGATIVE
C TRACH DNA SPEC QL PROBE+SIG AMP: NEGATIVE
HBV SURFACE AG SERPL QL IA: NEGATIVE
HIV 1+2 AB+HIV1 P24 AG SERPL QL IA: NEGATIVE
RUBELLA ABY IGG: NORMAL
TREPONEMA ANTIBODIES: NORMAL

## 2020-05-04 LAB — GROUP B STREP PCR: NEGATIVE

## 2020-06-01 DIAGNOSIS — Z34.90 ENCOUNTER FOR PLANNED INDUCTION OF LABOR: Primary | ICD-10-CM

## 2020-06-02 DIAGNOSIS — Z34.90 ENCOUNTER FOR PLANNED INDUCTION OF LABOR: ICD-10-CM

## 2020-06-02 PROCEDURE — 99207 ZZC NO BILLABLE SERVICE THIS VISIT: CPT

## 2020-06-02 PROCEDURE — 99000 SPECIMEN HANDLING OFFICE-LAB: CPT | Performed by: OBSTETRICS & GYNECOLOGY

## 2020-06-02 PROCEDURE — 87635 SARS-COV-2 COVID-19 AMP PRB: CPT | Performed by: OBSTETRICS & GYNECOLOGY

## 2020-06-03 LAB
SARS-COV-2 RNA SPEC QL NAA+PROBE: NOT DETECTED
SPECIMEN SOURCE: NORMAL

## 2020-06-04 NOTE — H&P
"  2020    Cristina Sorto  [id number]            OB Admit History & Physical      Ms. Sorto  is here for scheduled IOL at 41 weeks    GISELE=20 by LMP 19        Estimated body mass index is 25.22 kg/m  as calculated from the following:    Height as of 3/18/18: 1.702 m (5' 7\").    Weight as of 3/18/18: 73 kg (161 lb).  Her prenatal course has been complicated by     1. Fetal macrosomia. EFW at 40w3d on = 4253 g (9lb 6 oz) 98th%ile. AC 37.3cm. Elevated GCT, normal 3 hour GTT    2. Hypothryroidism in pregnancy. S/p Endocrinology consult, started on synthroid 75 mcg.       See prenatal for labs.  neg GBBS, Rubella Immune, RH positive    NIPT normal/neg    Estimated fetal weight= 4300 gm       She is a 29 year old   Her OB history:   OB History    Para Term  AB Living   1 0 0 0 0 0   SAB TAB Ectopic Multiple Live Births   0 0 0 0 0      # Outcome Date GA Lbr Hayedr/2nd Weight Sex Delivery Anes PTL Lv   1 Current                     Past Medical History:   Diagnosis Date     Melanoma in situ (H) 2008    left buttock--excised completely         No past surgical history on file.      Current Outpatient Medications   Medication Sig Dispense Refill     levonorgestrel-ethinyl estradiol (AVIANE,ALESSE,LESSINA) 0.1-20 MG-MCG per tablet Take 1 tablet by mouth daily 1 Package 0     SUMAtriptan (IMITREX) 50 MG tablet 1 at start of migraine ,may repeat in 1 hr 30 tablet 3       Allergies: Patient has no known allergies.      REVIEW OF SYSTEMS:  NEUROLOGIC:  Negative  EYES:  Negative  ENT:  Negative  GI:  Negative  BREAST:  Negative  :  Negative  GYN:  Negative  CV:  Negative  PULMONARY:  Negative  MUSCULOSKELETAL:  Negative  PSYCH:  Negative        Social History     Socioeconomic History     Marital status:      Spouse name: Not on file     Number of children: Not on file     Years of education: Not on file     Highest education level: Not on file   Occupational History     Not on file "   Social Needs     Financial resource strain: Not on file     Food insecurity     Worry: Not on file     Inability: Not on file     Transportation needs     Medical: Not on file     Non-medical: Not on file   Tobacco Use     Smoking status: Never Smoker     Smokeless tobacco: Never Used   Substance and Sexual Activity     Alcohol use: Yes     Comment: occasionally     Drug use: No     Sexual activity: Yes     Partners: Male     Birth control/protection: Condom, Pill   Lifestyle     Physical activity     Days per week: Not on file     Minutes per session: Not on file     Stress: Not on file   Relationships     Social connections     Talks on phone: Not on file     Gets together: Not on file     Attends [employer] service: Not on file     Active member of club or organization: Not on file     Attends meetings of clubs or organizations: Not on file     Relationship status: Not on file     Intimate partner violence     Fear of current or ex partner: Not on file     Emotionally abused: Not on file     Physically abused: Not on file     Forced sexual activity: Not on file   Other Topics Concern     Parent/sibling w/ CABG, MI or angioplasty before 65F 55M? No      Service No     Blood Transfusions No     Caffeine Concern No     Occupational Exposure No     Hobby Hazards No     Sleep Concern No     Stress Concern No     Weight Concern No     Special Diet No     Back Care No     Exercise Yes     Comment: 1-2     Bike Helmet Not Asked     Seat Belt Yes     Self-Exams No   Social History Narrative     Not on file      Family History   Problem Relation Age of Onset     Neurologic Disorder Sister      Melanoma Mother      Arthritis Father         gout     Lupus Sister                  Alert Awake in NAD  HEENT grossly normal  Neck: no lymphadenopathy or thryoidomegaly  Lungs CTAB  Back No spinal or CVAT  Heart RRR  ABD gravid, nontender on exam with vertex palpable  Pelvic:  No fluid noted, no blood noted  Cervix is 0.5  cm / 30% effaced at -3 station  EXT:  no edema or calf tenderness  Neuro:  Grossly intact    Assessment: 28 yo para 0 at 41 weeks presents for postdates induction of labor, suspected fetal macrosomia      Plan:  Cervidil induction     Discussed risks of labor and delivery with expected LGA  including shoulder dystocia with possible  asphyxia or brachial plexus injury, as well as risk of PP hemorrhage.      Jennifer L. Schwab, MD MD  Dept of OB/GYN  2020

## 2020-06-05 ENCOUNTER — HOSPITAL ENCOUNTER (INPATIENT)
Facility: CLINIC | Age: 30
LOS: 4 days | Discharge: HOME HEALTH | End: 2020-06-09
Attending: OBSTETRICS & GYNECOLOGY | Admitting: OBSTETRICS & GYNECOLOGY
Payer: COMMERCIAL

## 2020-06-05 DIAGNOSIS — Z34.90 ENCOUNTER FOR PLANNED INDUCTION OF LABOR: Primary | ICD-10-CM

## 2020-06-05 LAB
ABO + RH BLD: NORMAL
BLD GP AB SCN SERPL QL: NORMAL
BLOOD BANK CMNT PATIENT-IMP: NORMAL
HGB BLD-MCNC: 11.5 G/DL (ref 11.7–15.7)
SPECIMEN EXP DATE BLD: NORMAL
SPECIMEN EXP DATE BLD: NORMAL

## 2020-06-05 PROCEDURE — 10907ZC DRAINAGE OF AMNIOTIC FLUID, THERAPEUTIC FROM PRODUCTS OF CONCEPTION, VIA NATURAL OR ARTIFICIAL OPENING: ICD-10-PCS | Performed by: OBSTETRICS & GYNECOLOGY

## 2020-06-05 PROCEDURE — 86901 BLOOD TYPING SEROLOGIC RH(D): CPT | Performed by: OBSTETRICS & GYNECOLOGY

## 2020-06-05 PROCEDURE — 85018 HEMOGLOBIN: CPT | Performed by: OBSTETRICS & GYNECOLOGY

## 2020-06-05 PROCEDURE — 86900 BLOOD TYPING SEROLOGIC ABO: CPT | Performed by: OBSTETRICS & GYNECOLOGY

## 2020-06-05 PROCEDURE — 25000132 ZZH RX MED GY IP 250 OP 250 PS 637: Performed by: OBSTETRICS & GYNECOLOGY

## 2020-06-05 PROCEDURE — 12000000 ZZH R&B MED SURG/OB

## 2020-06-05 PROCEDURE — 36415 COLL VENOUS BLD VENIPUNCTURE: CPT | Performed by: OBSTETRICS & GYNECOLOGY

## 2020-06-05 PROCEDURE — 86850 RBC ANTIBODY SCREEN: CPT | Performed by: OBSTETRICS & GYNECOLOGY

## 2020-06-05 PROCEDURE — 25800030 ZZH RX IP 258 OP 636: Performed by: OBSTETRICS & GYNECOLOGY

## 2020-06-05 RX ORDER — OXYCODONE AND ACETAMINOPHEN 5; 325 MG/1; MG/1
1 TABLET ORAL
Status: DISCONTINUED | OUTPATIENT
Start: 2020-06-05 | End: 2020-06-09 | Stop reason: HOSPADM

## 2020-06-05 RX ORDER — SODIUM CHLORIDE, SODIUM LACTATE, POTASSIUM CHLORIDE, CALCIUM CHLORIDE 600; 310; 30; 20 MG/100ML; MG/100ML; MG/100ML; MG/100ML
INJECTION, SOLUTION INTRAVENOUS CONTINUOUS
Status: DISCONTINUED | OUTPATIENT
Start: 2020-06-05 | End: 2020-06-09 | Stop reason: HOSPADM

## 2020-06-05 RX ORDER — TERBUTALINE SULFATE 1 MG/ML
0.25 INJECTION, SOLUTION SUBCUTANEOUS
Status: DISCONTINUED | OUTPATIENT
Start: 2020-06-05 | End: 2020-06-09 | Stop reason: HOSPADM

## 2020-06-05 RX ORDER — LEVOTHYROXINE SODIUM 88 UG/1
112 TABLET ORAL
COMMUNITY
Start: 2020-05-14

## 2020-06-05 RX ORDER — VITAMIN A ACETATE, .BETA.-CAROTENE, ASCORBIC ACID, CHOLECALCIFEROL, .ALPHA.-TOCOPHEROL ACETATE, DL-, THIAMINE MONONITRATE, RIBOFLAVIN, NIACINAMIDE, PYRIDOXINE HYDROCHLORIDE, FOLIC ACID, CYANOCOBALAMIN, CALCIUM CARBONATE, FERROUS FUMARATE, ZINC OXIDE, AND CUPRIC OXIDE 2000; 2000; 120; 400; 22; 1.84; 3; 20; 10; 1; 12; 200; 27; 25; 2 [IU]/1; [IU]/1; MG/1; [IU]/1; MG/1; MG/1; MG/1; MG/1; MG/1; MG/1; UG/1; MG/1; MG/1; MG/1; MG/1
1 TABLET ORAL DAILY
COMMUNITY

## 2020-06-05 RX ORDER — METHYLERGONOVINE MALEATE 0.2 MG/ML
200 INJECTION INTRAVENOUS
Status: DISCONTINUED | OUTPATIENT
Start: 2020-06-05 | End: 2020-06-09 | Stop reason: HOSPADM

## 2020-06-05 RX ORDER — TRANEXAMIC ACID 10 MG/ML
1 INJECTION, SOLUTION INTRAVENOUS EVERY 30 MIN PRN
Status: DISCONTINUED | OUTPATIENT
Start: 2020-06-05 | End: 2020-06-09 | Stop reason: HOSPADM

## 2020-06-05 RX ORDER — HYDROXYZINE HYDROCHLORIDE 50 MG/1
100 TABLET, FILM COATED ORAL
Status: DISCONTINUED | OUTPATIENT
Start: 2020-06-05 | End: 2020-06-09 | Stop reason: HOSPADM

## 2020-06-05 RX ORDER — ACETAMINOPHEN 325 MG/1
650 TABLET ORAL EVERY 4 HOURS PRN
Status: DISCONTINUED | OUTPATIENT
Start: 2020-06-05 | End: 2020-06-09 | Stop reason: HOSPADM

## 2020-06-05 RX ORDER — OXYTOCIN 10 [USP'U]/ML
10 INJECTION, SOLUTION INTRAMUSCULAR; INTRAVENOUS
Status: DISCONTINUED | OUTPATIENT
Start: 2020-06-05 | End: 2020-06-09 | Stop reason: HOSPADM

## 2020-06-05 RX ORDER — FENTANYL CITRATE 50 UG/ML
50-100 INJECTION, SOLUTION INTRAMUSCULAR; INTRAVENOUS
Status: DISCONTINUED | OUTPATIENT
Start: 2020-06-05 | End: 2020-06-09 | Stop reason: HOSPADM

## 2020-06-05 RX ORDER — IBUPROFEN 400 MG/1
800 TABLET, FILM COATED ORAL
Status: DISCONTINUED | OUTPATIENT
Start: 2020-06-05 | End: 2020-06-09 | Stop reason: HOSPADM

## 2020-06-05 RX ORDER — ONDANSETRON 2 MG/ML
4 INJECTION INTRAMUSCULAR; INTRAVENOUS EVERY 6 HOURS PRN
Status: DISCONTINUED | OUTPATIENT
Start: 2020-06-05 | End: 2020-06-09 | Stop reason: HOSPADM

## 2020-06-05 RX ORDER — OXYTOCIN/0.9 % SODIUM CHLORIDE 30/500 ML
100-340 PLASTIC BAG, INJECTION (ML) INTRAVENOUS CONTINUOUS PRN
Status: COMPLETED | OUTPATIENT
Start: 2020-06-05 | End: 2020-06-07

## 2020-06-05 RX ORDER — NALOXONE HYDROCHLORIDE 0.4 MG/ML
.1-.4 INJECTION, SOLUTION INTRAMUSCULAR; INTRAVENOUS; SUBCUTANEOUS
Status: DISCONTINUED | OUTPATIENT
Start: 2020-06-05 | End: 2020-06-09 | Stop reason: HOSPADM

## 2020-06-05 RX ORDER — CARBOPROST TROMETHAMINE 250 UG/ML
250 INJECTION, SOLUTION INTRAMUSCULAR
Status: DISCONTINUED | OUTPATIENT
Start: 2020-06-05 | End: 2020-06-09 | Stop reason: HOSPADM

## 2020-06-05 RX ORDER — LEVOTHYROXINE SODIUM 88 UG/1
88 TABLET ORAL
Status: DISCONTINUED | OUTPATIENT
Start: 2020-06-06 | End: 2020-06-09 | Stop reason: HOSPADM

## 2020-06-05 RX ADMIN — HYDROXYZINE HYDROCHLORIDE 100 MG: 50 TABLET, FILM COATED ORAL at 23:11

## 2020-06-05 RX ADMIN — DINOPROSTONE 10 MG: 10 INSERT VAGINAL at 20:52

## 2020-06-05 RX ADMIN — SODIUM CHLORIDE, POTASSIUM CHLORIDE, SODIUM LACTATE AND CALCIUM CHLORIDE: 600; 310; 30; 20 INJECTION, SOLUTION INTRAVENOUS at 21:34

## 2020-06-05 ASSESSMENT — MIFFLIN-ST. JEOR: SCORE: 1705.18

## 2020-06-06 ENCOUNTER — ANESTHESIA (OUTPATIENT)
Dept: OBGYN | Facility: CLINIC | Age: 30
End: 2020-06-06
Payer: COMMERCIAL

## 2020-06-06 ENCOUNTER — ANESTHESIA EVENT (OUTPATIENT)
Dept: OBGYN | Facility: CLINIC | Age: 30
End: 2020-06-06
Payer: COMMERCIAL

## 2020-06-06 LAB — HGB BLD-MCNC: 13.3 G/DL (ref 11.7–15.7)

## 2020-06-06 PROCEDURE — 12000000 ZZH R&B MED SURG/OB

## 2020-06-06 PROCEDURE — 37000008 ZZH ANESTHESIA TECHNICAL FEE, 1ST 30 MIN: Performed by: OBSTETRICS & GYNECOLOGY

## 2020-06-06 PROCEDURE — 25000566 ZZH SEVOFLURANE, EA 15 MIN: Performed by: OBSTETRICS & GYNECOLOGY

## 2020-06-06 PROCEDURE — 25000132 ZZH RX MED GY IP 250 OP 250 PS 637: Performed by: OBSTETRICS & GYNECOLOGY

## 2020-06-06 PROCEDURE — 71000013 ZZH RECOVERY PHASE 1 LEVEL 1 EA ADDTL HR: Performed by: OBSTETRICS & GYNECOLOGY

## 2020-06-06 PROCEDURE — 25000128 H RX IP 250 OP 636: Performed by: OBSTETRICS & GYNECOLOGY

## 2020-06-06 PROCEDURE — 25000128 H RX IP 250 OP 636: Performed by: ANESTHESIOLOGY

## 2020-06-06 PROCEDURE — 3E0P7VZ INTRODUCTION OF HORMONE INTO FEMALE REPRODUCTIVE, VIA NATURAL OR ARTIFICIAL OPENING: ICD-10-PCS | Performed by: OBSTETRICS & GYNECOLOGY

## 2020-06-06 PROCEDURE — 37000009 ZZH ANESTHESIA TECHNICAL FEE, EACH ADDTL 15 MIN: Performed by: OBSTETRICS & GYNECOLOGY

## 2020-06-06 PROCEDURE — 86780 TREPONEMA PALLIDUM: CPT | Performed by: OBSTETRICS & GYNECOLOGY

## 2020-06-06 PROCEDURE — 25000125 ZZHC RX 250: Performed by: NURSE ANESTHETIST, CERTIFIED REGISTERED

## 2020-06-06 PROCEDURE — 3E0R3BZ INTRODUCTION OF ANESTHETIC AGENT INTO SPINAL CANAL, PERCUTANEOUS APPROACH: ICD-10-PCS | Performed by: ANESTHESIOLOGY

## 2020-06-06 PROCEDURE — 25000125 ZZHC RX 250: Performed by: OBSTETRICS & GYNECOLOGY

## 2020-06-06 PROCEDURE — 25000128 H RX IP 250 OP 636: Performed by: NURSE ANESTHETIST, CERTIFIED REGISTERED

## 2020-06-06 PROCEDURE — 86900 BLOOD TYPING SEROLOGIC ABO: CPT | Performed by: OBSTETRICS & GYNECOLOGY

## 2020-06-06 PROCEDURE — 37000011 ZZH ANESTHESIA WARD SERVICE

## 2020-06-06 PROCEDURE — 85018 HEMOGLOBIN: CPT | Performed by: OBSTETRICS & GYNECOLOGY

## 2020-06-06 PROCEDURE — 25800030 ZZH RX IP 258 OP 636: Performed by: OBSTETRICS & GYNECOLOGY

## 2020-06-06 PROCEDURE — 36415 COLL VENOUS BLD VENIPUNCTURE: CPT | Performed by: OBSTETRICS & GYNECOLOGY

## 2020-06-06 PROCEDURE — 36000056 ZZH SURGERY LEVEL 3 1ST 30 MIN: Performed by: OBSTETRICS & GYNECOLOGY

## 2020-06-06 PROCEDURE — 00HU33Z INSERTION OF INFUSION DEVICE INTO SPINAL CANAL, PERCUTANEOUS APPROACH: ICD-10-PCS | Performed by: ANESTHESIOLOGY

## 2020-06-06 PROCEDURE — 71000012 ZZH RECOVERY PHASE 1 LEVEL 1 FIRST HR: Performed by: OBSTETRICS & GYNECOLOGY

## 2020-06-06 PROCEDURE — 36000058 ZZH SURGERY LEVEL 3 EA 15 ADDTL MIN: Performed by: OBSTETRICS & GYNECOLOGY

## 2020-06-06 PROCEDURE — 25000125 ZZHC RX 250: Performed by: ANESTHESIOLOGY

## 2020-06-06 PROCEDURE — 27210794 ZZH OR GENERAL SUPPLY STERILE: Performed by: OBSTETRICS & GYNECOLOGY

## 2020-06-06 RX ORDER — SCOLOPAMINE TRANSDERMAL SYSTEM 1 MG/1
1 PATCH, EXTENDED RELEASE TRANSDERMAL ONCE
Status: DISCONTINUED | OUTPATIENT
Start: 2020-06-06 | End: 2020-06-09 | Stop reason: HOSPADM

## 2020-06-06 RX ORDER — NALOXONE HYDROCHLORIDE 0.4 MG/ML
.1-.4 INJECTION, SOLUTION INTRAMUSCULAR; INTRAVENOUS; SUBCUTANEOUS
Status: DISCONTINUED | OUTPATIENT
Start: 2020-06-06 | End: 2020-06-09 | Stop reason: HOSPADM

## 2020-06-06 RX ORDER — ROPIVACAINE HYDROCHLORIDE 2 MG/ML
INJECTION, SOLUTION EPIDURAL; INFILTRATION; PERINEURAL PRN
OUTPATIENT
Start: 2020-06-06

## 2020-06-06 RX ORDER — ONDANSETRON 4 MG/1
4 TABLET, ORALLY DISINTEGRATING ORAL EVERY 6 HOURS PRN
Status: DISCONTINUED | OUTPATIENT
Start: 2020-06-06 | End: 2020-06-09 | Stop reason: HOSPADM

## 2020-06-06 RX ORDER — LIDOCAINE HYDROCHLORIDE AND EPINEPHRINE BITARTRATE 20; .01 MG/ML; MG/ML
INJECTION, SOLUTION SUBCUTANEOUS PRN
Status: DISCONTINUED | OUTPATIENT
Start: 2020-06-06 | End: 2020-06-07

## 2020-06-06 RX ORDER — CITRIC ACID/SODIUM CITRATE 334-500MG
30 SOLUTION, ORAL ORAL
Status: DISCONTINUED | OUTPATIENT
Start: 2020-06-06 | End: 2020-06-07 | Stop reason: HOSPADM

## 2020-06-06 RX ORDER — ONDANSETRON 2 MG/ML
INJECTION INTRAMUSCULAR; INTRAVENOUS PRN
Status: DISCONTINUED | OUTPATIENT
Start: 2020-06-06 | End: 2020-06-07

## 2020-06-06 RX ORDER — SODIUM CHLORIDE, SODIUM LACTATE, POTASSIUM CHLORIDE, CALCIUM CHLORIDE 600; 310; 30; 20 MG/100ML; MG/100ML; MG/100ML; MG/100ML
INJECTION, SOLUTION INTRAVENOUS CONTINUOUS
Status: DISCONTINUED | OUTPATIENT
Start: 2020-06-06 | End: 2020-06-07 | Stop reason: HOSPADM

## 2020-06-06 RX ORDER — LIDOCAINE HYDROCHLORIDE AND EPINEPHRINE 15; 5 MG/ML; UG/ML
INJECTION, SOLUTION EPIDURAL PRN
OUTPATIENT
Start: 2020-06-06

## 2020-06-06 RX ORDER — EPHEDRINE SULFATE 50 MG/ML
5 INJECTION, SOLUTION INTRAMUSCULAR; INTRAVENOUS; SUBCUTANEOUS
Status: DISCONTINUED | OUTPATIENT
Start: 2020-06-06 | End: 2020-06-09 | Stop reason: HOSPADM

## 2020-06-06 RX ORDER — MORPHINE SULFATE 1 MG/ML
INJECTION, SOLUTION EPIDURAL; INTRATHECAL; INTRAVENOUS PRN
Status: DISCONTINUED | OUTPATIENT
Start: 2020-06-06 | End: 2020-06-07

## 2020-06-06 RX ORDER — CEFAZOLIN SODIUM 1 G/3ML
1 INJECTION, POWDER, FOR SOLUTION INTRAMUSCULAR; INTRAVENOUS SEE ADMIN INSTRUCTIONS
Status: DISCONTINUED | OUTPATIENT
Start: 2020-06-06 | End: 2020-06-07 | Stop reason: HOSPADM

## 2020-06-06 RX ORDER — FENTANYL CITRATE 50 UG/ML
100 INJECTION, SOLUTION INTRAMUSCULAR; INTRAVENOUS ONCE
Status: DISCONTINUED | OUTPATIENT
Start: 2020-06-06 | End: 2020-06-09 | Stop reason: HOSPADM

## 2020-06-06 RX ORDER — FENTANYL CITRATE 50 UG/ML
INJECTION, SOLUTION INTRAMUSCULAR; INTRAVENOUS PRN
OUTPATIENT
Start: 2020-06-06

## 2020-06-06 RX ORDER — ONDANSETRON 2 MG/ML
4 INJECTION INTRAMUSCULAR; INTRAVENOUS EVERY 6 HOURS PRN
Status: DISCONTINUED | OUTPATIENT
Start: 2020-06-06 | End: 2020-06-09 | Stop reason: HOSPADM

## 2020-06-06 RX ORDER — AZITHROMYCIN 500 MG/1
500 INJECTION, POWDER, LYOPHILIZED, FOR SOLUTION INTRAVENOUS
Status: COMPLETED | OUTPATIENT
Start: 2020-06-06 | End: 2020-06-06

## 2020-06-06 RX ORDER — LIDOCAINE 40 MG/G
CREAM TOPICAL
Status: DISCONTINUED | OUTPATIENT
Start: 2020-06-06 | End: 2020-06-09 | Stop reason: HOSPADM

## 2020-06-06 RX ORDER — CEFAZOLIN SODIUM 2 G/100ML
2 INJECTION, SOLUTION INTRAVENOUS
Status: COMPLETED | OUTPATIENT
Start: 2020-06-06 | End: 2020-06-06

## 2020-06-06 RX ORDER — ROPIVACAINE HYDROCHLORIDE 2 MG/ML
10 INJECTION, SOLUTION EPIDURAL; INFILTRATION; PERINEURAL ONCE
Status: DISCONTINUED | OUTPATIENT
Start: 2020-06-06 | End: 2020-06-09 | Stop reason: HOSPADM

## 2020-06-06 RX ORDER — OXYTOCIN/0.9 % SODIUM CHLORIDE 30/500 ML
1-24 PLASTIC BAG, INJECTION (ML) INTRAVENOUS CONTINUOUS
Status: DISCONTINUED | OUTPATIENT
Start: 2020-06-06 | End: 2020-06-09 | Stop reason: HOSPADM

## 2020-06-06 RX ORDER — ROPIVACAINE HYDROCHLORIDE 2 MG/ML
INJECTION, SOLUTION EPIDURAL; INFILTRATION; PERINEURAL
Status: COMPLETED
Start: 2020-06-06 | End: 2020-06-06

## 2020-06-06 RX ORDER — NALBUPHINE HYDROCHLORIDE 10 MG/ML
2.5-5 INJECTION, SOLUTION INTRAMUSCULAR; INTRAVENOUS; SUBCUTANEOUS EVERY 6 HOURS PRN
Status: DISCONTINUED | OUTPATIENT
Start: 2020-06-06 | End: 2020-06-09 | Stop reason: HOSPADM

## 2020-06-06 RX ADMIN — LIDOCAINE HYDROCHLORIDE AND EPINEPHRINE BITARTRATE 10 ML: 20; .01 INJECTION, SOLUTION SUBCUTANEOUS at 23:02

## 2020-06-06 RX ADMIN — ROPIVACAINE HYDROCHLORIDE 5 ML: 2 INJECTION, SOLUTION EPIDURAL; INFILTRATION at 09:41

## 2020-06-06 RX ADMIN — SODIUM CHLORIDE, POTASSIUM CHLORIDE, SODIUM LACTATE AND CALCIUM CHLORIDE: 600; 310; 30; 20 INJECTION, SOLUTION INTRAVENOUS at 23:55

## 2020-06-06 RX ADMIN — LIDOCAINE HYDROCHLORIDE AND EPINEPHRINE BITARTRATE 5 ML: 20; .01 INJECTION, SOLUTION SUBCUTANEOUS at 23:13

## 2020-06-06 RX ADMIN — CEFAZOLIN SODIUM 2 G: 2 INJECTION, SOLUTION INTRAVENOUS at 23:08

## 2020-06-06 RX ADMIN — ONDANSETRON 4 MG: 2 INJECTION INTRAMUSCULAR; INTRAVENOUS at 23:35

## 2020-06-06 RX ADMIN — LIDOCAINE HYDROCHLORIDE AND EPINEPHRINE 3 ML: 15; 5 INJECTION, SOLUTION EPIDURAL at 09:38

## 2020-06-06 RX ADMIN — AZITHROMYCIN MONOHYDRATE 500 MG: 500 INJECTION, POWDER, LYOPHILIZED, FOR SOLUTION INTRAVENOUS at 23:20

## 2020-06-06 RX ADMIN — SODIUM CHLORIDE, POTASSIUM CHLORIDE, SODIUM LACTATE AND CALCIUM CHLORIDE: 600; 310; 30; 20 INJECTION, SOLUTION INTRAVENOUS at 16:43

## 2020-06-06 RX ADMIN — FENTANYL CITRATE 100 MCG: 50 INJECTION, SOLUTION INTRAMUSCULAR; INTRAVENOUS at 09:41

## 2020-06-06 RX ADMIN — LEVOTHYROXINE SODIUM 88 MCG: 88 TABLET ORAL at 07:33

## 2020-06-06 RX ADMIN — Medication 12 ML/HR: at 16:39

## 2020-06-06 RX ADMIN — Medication 12 ML/HR: at 09:49

## 2020-06-06 RX ADMIN — MORPHINE SULFATE 2 MG: 1 INJECTION, SOLUTION EPIDURAL; INTRATHECAL; INTRAVENOUS at 23:36

## 2020-06-06 RX ADMIN — ROPIVACAINE HYDROCHLORIDE 5 ML: 2 INJECTION, SOLUTION EPIDURAL; INFILTRATION at 09:45

## 2020-06-06 RX ADMIN — SODIUM CHLORIDE, POTASSIUM CHLORIDE, SODIUM LACTATE AND CALCIUM CHLORIDE: 600; 310; 30; 20 INJECTION, SOLUTION INTRAVENOUS at 09:25

## 2020-06-06 RX ADMIN — FENTANYL CITRATE 100 MCG: 50 INJECTION, SOLUTION INTRAMUSCULAR; INTRAVENOUS at 04:18

## 2020-06-06 RX ADMIN — Medication 2 MILLI-UNITS/MIN: at 15:09

## 2020-06-06 RX ADMIN — ROPIVACAINE HYDROCHLORIDE 7 ML: 2 INJECTION, SOLUTION EPIDURAL; INFILTRATION at 11:34

## 2020-06-06 RX ADMIN — SODIUM CHLORIDE, POTASSIUM CHLORIDE, SODIUM LACTATE AND CALCIUM CHLORIDE 1000 ML: 600; 310; 30; 20 INJECTION, SOLUTION INTRAVENOUS at 08:48

## 2020-06-06 ASSESSMENT — ENCOUNTER SYMPTOMS: SEIZURES: 0

## 2020-06-06 ASSESSMENT — LIFESTYLE VARIABLES: TOBACCO_USE: 0

## 2020-06-06 NOTE — PROVIDER NOTIFICATION
20   Provider Notification   Provider Name/Title Dr Kessler   Method of Notification Phone   Request Evaluate - Remote   Notification Reason Patient Arrived;SVE;Other (Comment)  (orders)     Dr Kessler paged with return call received, Dr Kessler identified herself as Cristina's primary OB and is familier with her. Update included but not limited to: patient is 41 weeks gestation, , health history includes migraines in first trimester and hypothyroidism, taking levothyroxine daily, GBS negative, Rh+, SVE 0/50%/-3, fetal heart rate baseline of 135-140, moderate variability, accelerations present, decelerations occassionally present when patient sitting in semifolwers and parul every 6-12 minutes. Patient plans for pain management is an epidural.    Plan per provider is IOL with cervidil. MD reports that she will be here in the morning to see the patient. Will contact MD as needed.

## 2020-06-06 NOTE — PLAN OF CARE
Dr. Kessler updated by phone FHR min variability, pitocin at 6mU, last 3 uc's measure stronger.  No new orders rec'd.

## 2020-06-06 NOTE — PLAN OF CARE
1500.   called after electronic updates, plan to augment labor with Pitocin per protocol.    Pt verbalizes agreement and understanding to use Pitocin to try to strengthen contractions.   Order received to insert IUPC with next SVE if unchanged from 1405 exam.

## 2020-06-06 NOTE — PROGRESS NOTES
"OB  Patient uncomfortable with ctxns, breathing through them, ambulating    /64   Temp 99.7  F (37.6  C) (Temporal)   Resp 18   Ht 1.702 m (5' 7\")   Wt 95.3 kg (210 lb)   BMI 32.89 kg/m       Cervidil removed  Cx: 4-5/70/-2  AROM performed, moderate amount clear fluid  FHT baseline 140, periods of decreased variability, moderate variability. Accels noted with exam, category 1  Ctxns slightly irregular, q1-4\"    A/P: 28 yo  @ 41 1/7 weeks PD IOL, GBS neg  Cervidil in place for about 12 hours, good cervical change noted.  Patient now entering active labor, uncomfortable.  Epidural for pain relief as requested by patient.  Consider pitocin augmentation if needed.   Positional changes to assist with fetal descent.  Monitor maternal temp closely.  Kristen Kessler MD, MD on 2020 at 9:19 AM    "

## 2020-06-06 NOTE — PLAN OF CARE
"Patient stable overnight. Cervidil placed at 2052 last night, still in place and due to be removed this AM by 0852. Patient feeling contractions as \"more intense\" per patient as the night went on, received pain medication x1, see MAR. Able to sleep on and off. See flowsheet for cervical exams and mares scores.   "

## 2020-06-06 NOTE — PLAN OF CARE
"Spoke to MERLIN, regarding pt still feeling increase in pain with contractions since epidural placed.   Request to have him come and evaluate pts epidural now.    1120.  , MERLIN. At bedside, decision made to replace labor epidural.   Previous epidural removed by MERLIN, tip intact at 1122, pt now sitting on edge of bed for procedure, timeout preformed.     1126.  Test dose given.     1135.  Pt laid back down in bed with left tilt.   Pt denies any signs/symptoms of toxicity.    1145.  Pt feeling warm and tingly, states, \"I am already feeling better\".    1155.  Pt snoring, feeling unaware of contractions.    "

## 2020-06-06 NOTE — ANESTHESIA PROCEDURE NOTES
Procedure note : epidural catheter  Staff -   Anesthesiologist:  Jaclyn Salgado MD      Performed By: anesthesiologist        Pre-Procedure  Performed by Jaclyn Salgado MD  Location: OB      Pre-Anesthestic Checklist: patient identified, IV checked, site marked, risks and benefits discussed, informed consent, monitors and equipment checked, pre-op evaluation and at physician/surgeon's request    Timeout  Correct Patient: Yes   Correct Procedure: Yes   Correct Site: Yes   Correct Laterality: Yes   Correct Position: Yes   Site Marked: Yes   .   Procedure Documentation    .    Procedure: epidural catheter, .   Patient Position:sitting Insertion Site:L2-3  (midline approach) Injection technique: LORT saline and LORT air   Local skin infiltrated with 1 mL of 1% lidocaine.      Patient Prep/Sterile Barriers; povidone-iodine 7.5% surgical scrub.  .  Needle: Touhy needle   Needle Gauge: 17.    Needle Length (Inches) 3.5   # of attempts: 1 and # of redirects:  .    Catheter: 19 G . .  Catheter threaded easily  .  .   .    Assessment/Narrative  Paresthesias: No.  .  .  Aspiration negative for heme or CSF  . Test dose of 3 mL lidocaine 1.5% w/ 1:200,000 epinephrine at. Test dose negative for signs of intravascular, subdural or intrathecal injection. Comments:  Pre-procedure time out completed. Patient in sitting position, the lumbar spine was prepped and draped in sterile fashion. The L2/L3 interspace was identified and local anesthetic was injected for local skin infiltration. A 17 G touhy needle was advanced to the epidural space which was confirmed with the loss of resistance technique at 6 cm. A catheter was then advanced easily into the epidural space. The catheter was left at 10 cm at the skin. Negative aspiration of blood and CSF was confirmed. A test dose of 1.5% lidocaine with 1:200,000 epinephrine was injected through the catheter and was negative for intravascular injection. The site was covered with sterile  tegaderm and the catheter was secured with tape.

## 2020-06-06 NOTE — PROVIDER NOTIFICATION
06/06/20 0434   Provider Notification   Provider Name/Title Dr Kessler   Method of Notification Electronic Page   Request Evaluate - Remote   Notification Reason Other (Comment)  (text page)     Text page as follows: FYI SVE 0/50%/-3 Campos 3. Fentanyl given x1. Call if questions.  Jackie GOMEZ RN  [phone]

## 2020-06-06 NOTE — PLAN OF CARE
Patient reports that her contractions are keeping her awake and she is having to breathe through them for at least an hour and a half. Patient requested pain medication to help manage the contraction pain and get some rest. Patient's cervix was checked and assesses to be  Mid position 0/50%/-3. Pain medication given, see MAR. MD updated, see note. Will continue to monitor patient.

## 2020-06-06 NOTE — PLAN OF CARE
Pt still feeling contractions in lower vaginal area, repositioned pt to right side, encouraged use of epidural bolus as needed.   Unable to place indwelling hernandez catheter at this time bc pt too uncomfortable with touch.    Will continue to monitor.

## 2020-06-06 NOTE — PLAN OF CARE
0874.   at bedside, discussed plan of care, removed cervidil, AROM performed, clear fluid and SVE 4-5/70/-2.    Pt breathing through contractions, pt requesting epidural, LR bolus started.   Questions answered.

## 2020-06-06 NOTE — ANESTHESIA PREPROCEDURE EVALUATION
Anesthesia Pre-Procedure Evaluation    Patient: Cristina Sorto   MRN: [id number] : 1990          Preoperative Diagnosis: * No surgery found *        Past Medical History:   Diagnosis Date     Melanoma in situ (H) 2008    left buttock--excised completely      Migraine first trimester     Thyroid disease     hypothroidism     History reviewed. No pertinent surgical history.  No Known Allergies  Prior to Admission medications    Medication Sig Start Date End Date Taking? Authorizing Provider   levothyroxine (SYNTHROID/LEVOTHROID) 88 MCG tablet  20  Yes Reported, Patient   Prenatal Vit-Fe Fumarate-FA (PNV PRENATAL PLUS MULTIVITAMIN) 27-1 MG TABS per tablet Take 1 tablet by mouth daily   Yes Reported, Patient   levonorgestrel-ethinyl estradiol (AVIANE,ALESSE,LESSINA) 0.1-20 MG-MCG per tablet Take 1 tablet by mouth daily 14   Kacy Alvarado PA-C   SUMAtriptan (IMITREX) 50 MG tablet 1 at start of migraine ,may repeat in 1 hr 16   Roberto Delgado MD       Anesthesia Evaluation     .             ROS/MED HX    ENT/Pulmonary:       Neurologic:     (+)migraines,     Cardiovascular:         METS/Exercise Tolerance:     Hematologic:         Musculoskeletal:         GI/Hepatic:         Renal/Genitourinary:         Endo:     (+) thyroid problem hypothyroidism, .      Psychiatric:         Infectious Disease:         Malignancy:         Other:                                 Lab Results   Component Value Date    WBC 4.6 2013    HGB 11.5 (L) 2020    HCT 38.6 2013     2013    SED 19 2013     2012    POTASSIUM 4.6 2012    CHLORIDE 105 2012    CO2 23 2012    BUN 14 2012    CR 0.85 2012    GLC 91 2012    SURAJ 9.4 2012    ALBUMIN 4.3 2012    PROTTOTAL 8.0 2012    ALT 21 2012    AST 23 2012    ALKPHOS 52 2012    BILITOTAL 0.5 2012    TSH 3.06 2012    T4 0.75 2012     "HCG Negative 08/30/2011       Preop Vitals  BP Readings from Last 3 Encounters:   06/06/20 110/64   03/18/18 (!) 131/98   01/05/18 118/78    Pulse Readings from Last 3 Encounters:   03/18/18 86   01/05/18 78   09/13/16 68      Resp Readings from Last 3 Encounters:   06/06/20 18   03/18/18 16   01/05/18 16    SpO2 Readings from Last 3 Encounters:   03/18/18 98%   01/05/18 100%   01/01/18 98%      Temp Readings from Last 1 Encounters:   06/06/20 37.6  C (99.7  F) (Temporal)    Ht Readings from Last 1 Encounters:   06/05/20 1.702 m (5' 7\")      Wt Readings from Last 1 Encounters:   06/05/20 95.3 kg (210 lb)    Estimated body mass index is 32.89 kg/m  as calculated from the following:    Height as of this encounter: 1.702 m (5' 7\").    Weight as of this encounter: 95.3 kg (210 lb).       Anesthesia Plan      History & Physical Review      ASA Status:  2 .        Plan for Epidural            Postoperative Care      Consents  Anesthetic plan, risks, benefits and alternatives discussed with:  Patient..                 Jaclyn Salgado MD  "

## 2020-06-06 NOTE — PLAN OF CARE
Data: Patient admitted to room 215 at 1940. Patient is a . Prenatal record reviewed. Patient has a history of hypothyroidism and is on levothyroxine daily, and experienced migraines in the first trimester. GBS negative. Hep B negative, and rubella immune.  Gestational age 41w0d. Reports fetal movement and denies vaginal bleeding and leaking of fluid. Vital signs per doc flowsheet. Fetal movement present. Patient reports Induction Of Labor (cervical ripening)   as reason for admission. Support persons Jose De Jesus present.  Action:  Verbal consent for EFM, external fetal monitors applied. Admission assessment completed. Patient and support persons educated on labor process. Patient instructed to report change in fetal movement, contractions, vaginal leaking of fluid or bleeding, abdominal pain, or any concerns related to the pregnancy to her nurse/physician. Patient oriented to room, call light in reach.   Response: Dr. Kessler informed of patients arrival. Plan per provider is cervidil. Patient verbalized understanding of education and verbalized agreement with plan. Patient plans to have an epidural for pain once the time comes.

## 2020-06-07 LAB — T PALLIDUM AB SER QL: NONREACTIVE

## 2020-06-07 PROCEDURE — 25000128 H RX IP 250 OP 636

## 2020-06-07 PROCEDURE — 25000132 ZZH RX MED GY IP 250 OP 250 PS 637: Performed by: OBSTETRICS & GYNECOLOGY

## 2020-06-07 PROCEDURE — 25000125 ZZHC RX 250: Performed by: OBSTETRICS & GYNECOLOGY

## 2020-06-07 PROCEDURE — 25000128 H RX IP 250 OP 636: Performed by: OBSTETRICS & GYNECOLOGY

## 2020-06-07 PROCEDURE — 12000035 ZZH R&B POSTPARTUM

## 2020-06-07 RX ORDER — ACETAMINOPHEN 325 MG/1
975 TABLET ORAL EVERY 6 HOURS
Status: DISCONTINUED | OUTPATIENT
Start: 2020-06-07 | End: 2020-06-09 | Stop reason: HOSPADM

## 2020-06-07 RX ORDER — OXYCODONE HYDROCHLORIDE 5 MG/1
5 TABLET ORAL EVERY 4 HOURS PRN
Status: DISCONTINUED | OUTPATIENT
Start: 2020-06-07 | End: 2020-06-09 | Stop reason: HOSPADM

## 2020-06-07 RX ORDER — IBUPROFEN 400 MG/1
800 TABLET, FILM COATED ORAL EVERY 6 HOURS
Status: DISCONTINUED | OUTPATIENT
Start: 2020-06-07 | End: 2020-06-09 | Stop reason: HOSPADM

## 2020-06-07 RX ORDER — MODIFIED LANOLIN
OINTMENT (GRAM) TOPICAL
Status: DISCONTINUED | OUTPATIENT
Start: 2020-06-07 | End: 2020-06-09 | Stop reason: HOSPADM

## 2020-06-07 RX ORDER — AMOXICILLIN 250 MG
1 CAPSULE ORAL 2 TIMES DAILY
Status: DISCONTINUED | OUTPATIENT
Start: 2020-06-07 | End: 2020-06-09 | Stop reason: HOSPADM

## 2020-06-07 RX ORDER — KETOROLAC TROMETHAMINE 30 MG/ML
30 INJECTION, SOLUTION INTRAMUSCULAR; INTRAVENOUS EVERY 6 HOURS
Status: COMPLETED | OUTPATIENT
Start: 2020-06-07 | End: 2020-06-07

## 2020-06-07 RX ORDER — OXYTOCIN/0.9 % SODIUM CHLORIDE 30/500 ML
340 PLASTIC BAG, INJECTION (ML) INTRAVENOUS CONTINUOUS PRN
Status: DISCONTINUED | OUTPATIENT
Start: 2020-06-07 | End: 2020-06-09 | Stop reason: HOSPADM

## 2020-06-07 RX ORDER — BISACODYL 10 MG
10 SUPPOSITORY, RECTAL RECTAL DAILY PRN
Status: DISCONTINUED | OUTPATIENT
Start: 2020-06-09 | End: 2020-06-09 | Stop reason: HOSPADM

## 2020-06-07 RX ORDER — LIDOCAINE 40 MG/G
CREAM TOPICAL
Status: DISCONTINUED | OUTPATIENT
Start: 2020-06-07 | End: 2020-06-09 | Stop reason: HOSPADM

## 2020-06-07 RX ORDER — NALOXONE HYDROCHLORIDE 0.4 MG/ML
.1-.4 INJECTION, SOLUTION INTRAMUSCULAR; INTRAVENOUS; SUBCUTANEOUS
Status: DISCONTINUED | OUTPATIENT
Start: 2020-06-07 | End: 2020-06-09 | Stop reason: HOSPADM

## 2020-06-07 RX ORDER — AMOXICILLIN 250 MG
2 CAPSULE ORAL 2 TIMES DAILY
Status: DISCONTINUED | OUTPATIENT
Start: 2020-06-07 | End: 2020-06-09 | Stop reason: HOSPADM

## 2020-06-07 RX ORDER — TRANEXAMIC ACID 10 MG/ML
1 INJECTION, SOLUTION INTRAVENOUS EVERY 30 MIN PRN
Status: DISCONTINUED | OUTPATIENT
Start: 2020-06-07 | End: 2020-06-09 | Stop reason: HOSPADM

## 2020-06-07 RX ORDER — OXYTOCIN 10 [USP'U]/ML
10 INJECTION, SOLUTION INTRAMUSCULAR; INTRAVENOUS
Status: DISCONTINUED | OUTPATIENT
Start: 2020-06-07 | End: 2020-06-09 | Stop reason: HOSPADM

## 2020-06-07 RX ORDER — HYDROCORTISONE 2.5 %
CREAM (GRAM) TOPICAL 3 TIMES DAILY PRN
Status: DISCONTINUED | OUTPATIENT
Start: 2020-06-07 | End: 2020-06-09 | Stop reason: HOSPADM

## 2020-06-07 RX ORDER — DEXTROSE, SODIUM CHLORIDE, SODIUM LACTATE, POTASSIUM CHLORIDE, AND CALCIUM CHLORIDE 5; .6; .31; .03; .02 G/100ML; G/100ML; G/100ML; G/100ML; G/100ML
INJECTION, SOLUTION INTRAVENOUS CONTINUOUS
Status: DISCONTINUED | OUTPATIENT
Start: 2020-06-07 | End: 2020-06-09 | Stop reason: HOSPADM

## 2020-06-07 RX ORDER — KETOROLAC TROMETHAMINE 30 MG/ML
INJECTION, SOLUTION INTRAMUSCULAR; INTRAVENOUS
Status: COMPLETED
Start: 2020-06-07 | End: 2020-06-07

## 2020-06-07 RX ORDER — OXYTOCIN/0.9 % SODIUM CHLORIDE 30/500 ML
100 PLASTIC BAG, INJECTION (ML) INTRAVENOUS CONTINUOUS
Status: DISCONTINUED | OUTPATIENT
Start: 2020-06-07 | End: 2020-06-09 | Stop reason: HOSPADM

## 2020-06-07 RX ORDER — SIMETHICONE 80 MG
80 TABLET,CHEWABLE ORAL 4 TIMES DAILY PRN
Status: DISCONTINUED | OUTPATIENT
Start: 2020-06-07 | End: 2020-06-09 | Stop reason: HOSPADM

## 2020-06-07 RX ORDER — ONDANSETRON 2 MG/ML
4 INJECTION INTRAMUSCULAR; INTRAVENOUS EVERY 6 HOURS PRN
Status: DISCONTINUED | OUTPATIENT
Start: 2020-06-07 | End: 2020-06-09 | Stop reason: HOSPADM

## 2020-06-07 RX ORDER — LEVOTHYROXINE SODIUM 88 UG/1
88 TABLET ORAL
Status: DISCONTINUED | OUTPATIENT
Start: 2020-06-07 | End: 2020-06-09 | Stop reason: HOSPADM

## 2020-06-07 RX ADMIN — LEVOTHYROXINE SODIUM 88 MCG: 88 TABLET ORAL at 08:02

## 2020-06-07 RX ADMIN — KETOROLAC TROMETHAMINE 30 MG: 30 INJECTION, SOLUTION INTRAMUSCULAR at 13:26

## 2020-06-07 RX ADMIN — KETOROLAC TROMETHAMINE 30 MG: 30 INJECTION, SOLUTION INTRAMUSCULAR at 07:56

## 2020-06-07 RX ADMIN — KETOROLAC TROMETHAMINE 30 MG: 30 INJECTION, SOLUTION INTRAMUSCULAR at 01:22

## 2020-06-07 RX ADMIN — ACETAMINOPHEN 975 MG: 325 TABLET, FILM COATED ORAL at 11:08

## 2020-06-07 RX ADMIN — DOCUSATE SODIUM 50 MG AND SENNOSIDES 8.6 MG 1 TABLET: 8.6; 5 TABLET, FILM COATED ORAL at 11:07

## 2020-06-07 RX ADMIN — DOCUSATE SODIUM 50 MG AND SENNOSIDES 8.6 MG 1 TABLET: 8.6; 5 TABLET, FILM COATED ORAL at 20:17

## 2020-06-07 RX ADMIN — OXYCODONE HYDROCHLORIDE 5 MG: 5 TABLET ORAL at 06:29

## 2020-06-07 RX ADMIN — OXYCODONE HYDROCHLORIDE 5 MG: 5 TABLET ORAL at 15:38

## 2020-06-07 RX ADMIN — IBUPROFEN 800 MG: 400 TABLET, FILM COATED ORAL at 20:16

## 2020-06-07 RX ADMIN — SODIUM CHLORIDE, SODIUM LACTATE, POTASSIUM CHLORIDE, CALCIUM CHLORIDE AND DEXTROSE MONOHYDRATE: 5; 600; 310; 30; 20 INJECTION, SOLUTION INTRAVENOUS at 08:33

## 2020-06-07 RX ADMIN — Medication 100 ML/HR: at 03:25

## 2020-06-07 RX ADMIN — ACETAMINOPHEN 975 MG: 325 TABLET, FILM COATED ORAL at 18:25

## 2020-06-07 RX ADMIN — OXYCODONE HYDROCHLORIDE 5 MG: 5 TABLET ORAL at 20:16

## 2020-06-07 RX ADMIN — ACETAMINOPHEN 975 MG: 325 TABLET, FILM COATED ORAL at 05:29

## 2020-06-07 RX ADMIN — OXYCODONE HYDROCHLORIDE 5 MG: 5 TABLET ORAL at 11:07

## 2020-06-07 NOTE — PROGRESS NOTES
"OB  Patient is comfortable, no concerns  /75   Temp 100.2  F (37.9  C)   Resp 18   Ht 1.702 m (5' 7\")   Wt 95.3 kg (210 lb)   SpO2 100%   BMI 32.89 kg/m       Cx: 7-8/80/+1, caput noted  FHT baseline 145, moderate variability, occasional late decels, overall reassuring  Ctxns q2\", pitocin 12 mu, not adequate by MV units    A/P: 30 yo  @ 41 1/7 weeks, PD IOL, active labor  Positional changes to assist with fetal descent.  Monitor FHT closely.  Monitor maternal temp closely, Maternal HR, FHT for changes c/w chorioamnionitis.   Kristen Kessler MD, MD on 2020 at 9:55 PM  "

## 2020-06-07 NOTE — OP NOTE
Procedure Date: 2020      PREOPERATIVE DIAGNOSES:   1.  Intrauterine pregnancy at 41 weeks and 1 day gestation.   2.  Failed postdate induction of labor.     3.  Fetal intolerance of labor.      POSTOPERATIVE DIAGNOSES:   1.  Intrauterine pregnancy at 41 weeks and 1 day gestation.   2.  Failed postdate induction of labor.     3.  Fetal intolerance of labor.      PROCEDURE:  Primary low transverse  section.      SURGEON:  Kristen Kessler MD      ANESTHESIA:  Epidural.      INTRAVENOUS FLUIDS:  1200 mL lactated Ringer's.      QUANTITATIVE BLOOD LOSS:  760 mL.      URINE OUTPUT:  350 mL.      FINDINGS:  Viable female infant in cephalic presentation, weighing 10 pounds 1 ounce, Apgars 9 and 9.  Normal-appearing uterus, tubes and ovaries.        CONDITION AFTER DELIVERY:  Stable.      CLINICAL NOTE:  The patient is a 29-year-old  1, para 0 who presented for postdates induction of labor at 41 weeks.  Pregnancy had been essentially uncomplicated.  The patient did take thyroid medication for hypothyroidism.  The patient's GBS status was negative.  COVID testing was performed prior to admission, and that was found to be negative.  The patient was admitted at 41 weeks and underwent cervical ripening with Cervidil and did start having regular uncomfortable contractions late into the course of the Cervidil.  After approximately 12 hours after the Cervidil being in place, cervical exam was performed, demonstrating the cervix to be 4-5 cm, 70% effaced, -2 station.  At that time, artificial rupture of membranes was performed, demonstrating clear fluid.  The patient eventually did receive an epidural for pain relief and subsequent Pitocin augmentation to assist with labor augmentation.  The patient did progress through labor and did get to approximately 9 cm dilated; however, there was evidence of some recurrent late decelerations and increasing maternal temperature to 100.8.  There was no evidence of fetal  tachycardia or maternal tachycardia.  In view of the recurrent late decelerations and a prolonged labor course and suspicion that she was remote from vaginal delivery, it was recommended to proceed with a primary low transverse  section.  Both the patient and her  were in agreement.  Consent form was signed, and she was then taken to the operating room.      DESCRIPTION OF PROCEDURE:  The patient was taken to the operating room.  Ugalde catheter was already placed to continuous drainage.  Epidural catheter was then redosed for the abdominal procedure.  The fetal pillow was then placed vaginally and inflated as a means to elevate the fetal head up out of the pelvis to assist with delivery of the fetal head at time of .  The patient was then prepped and draped in normal sterile fashion for a  section.  Appropriate timeout was then performed.  A Pfannenstiel skin incision was then made approximately 2 cm over the symphysis pubis.  This incision was extended down to the rectus fascia.  Rectus fascia was nicked in the midline and  bilaterally.  Rectus fascia was  off the rectus muscles with a combination of both sharp and blunt dissection.  Rectus muscles were  in the midline.  Peritoneum was tented up and entered sharply.  Peritoneum was then extended inferiorly and superiorly, with good visualization of the bladder and the lower uterine segment.  Lower uterine segment was identified, and bladder flap was then created with a combination of both sharp and blunt dissection.  Lower uterine segment was then incised with a scalpel.  This incision was extended down to the amniotic sac, where the amniotic sac was then incised.  The uterine incision was then extended bluntly, and the infant's head was then delivered out of the pelvis.  The infant's head was delivered out of the uterine incision.  No nuchal cord was noted.  Using fundal pressure, the remainder of the  infant was then delivered.  This was a viable female infant weighing 10 pounds 1 ounce, Apgars 9 and 9.  Infant did have a vigorous cry at delivery.  Cord was then doubly clamped and cut after delayed cord clamping, and infant was handed to the waiting nurse.  The placenta was then delivered manually.  The uterus was exteriorized of all clots and debris.  Uterine incision was then reapproximated with 2 layers using 0 chromic.  The uterine incision was then examined and noted to be hemostatic.  The uterus was then replaced in the abdomen.  The gutters were cleared of all clots and debris.  Uterine incision was then reexamined.  Small bleeders were made hemostatic with figure-of-eight sutures.  The rectus muscles were reapproximated with 2 interrupted sutures of 0 chromic.  The fascia was reapproximated with a running 0 Vicryl.  Subcutaneous tissue was reapproximated with a running 3-0 plain, and the skin was closed in subcuticular fashion with a 4-0 Vicryl on a Santos needle.  Steri-Strips were placed, and a normal dressing was then placed on the uterine incision.  The fetal pillow was then deflated and removed from the vagina.  Fundal check was performed, demonstrating the fundus to be firm at the end of delivery, all sponge, lap and needle counts were correct x 2.  The patient was taken to recovery room in stable condition.         ADA GARCIA MD             D: 2020   T: 2020   MT: RENÉ      Name:     RIRI ULRICH   MRN:      [id number]        Account:        [id number]   :      1990           Procedure Date: 2020      Document: [id number]

## 2020-06-07 NOTE — PROVIDER NOTIFICATION
06/06/20 2142   Provider Notification   Provider Name/Title Dr son    Method of Notification At Bedside   Request Evaluate in Person   Notification Reason Status Update;SVE   Dr on unit viewed EFM tracing aware of late and variable decelerations. Overall mod. LTV . Dr to see pt SVE as documented will continue POC w/ increasing pitocin, assessing VS, EFM tracing.

## 2020-06-07 NOTE — ANESTHESIA CARE TRANSFER NOTE
Patient: Cristina Sorto    Procedure(s):   SECTION    Diagnosis: * No pre-op diagnosis entered *  Diagnosis Additional Information: No value filed.    Anesthesia Type:   Epidural     Note:  Airway :Room Air  Patient transferred to:PACU  Comments: Transferred to OB PACU recovery, spontaneous respirations on room air with oxygen saturations maintained greater than 98%. SpO2, NiBP, and EKG monitors and alarms on and functioning, report on patient's clinical status given to OB recovery RN, RN questions answered, patient in hospital bed with siderails up, Oxygen tubing connected to wall O2 in OB PACU Oxytocin 30 units in 500mL infusion connected to IV infusion pump in recovery bay and programmed to 100 mL/hr at handoff of care.  Handoff Report: Identifed the Patient, Identified the Reponsible Provider, Reviewed the pertinent medical history, Discussed the surgical course, Reviewed Intra-OP anesthesia mangement and issues during anesthesia, Set expectations for post-procedure period and Allowed opportunity for questions and acknowledgement of understanding      Vitals: (Last set prior to Anesthesia Care Transfer)    CRNA VITALS  2020 2358 - 2020 0035      2020             Resp Rate (set):  10                Electronically Signed By: JACKLYN Finn CRNA  2020  12:35 AM

## 2020-06-07 NOTE — PROGRESS NOTES
"OB  Patient starting to feel more rectal pressure but generally comfortable  /75   Temp 100.2  F (37.9  C)   Resp 18   Ht 1.702 m (5' 7\")   Wt 95.3 kg (210 lb)   SpO2 100%   BMI 32.89 kg/m       Maternal temp now 100.8  Cx 9/100/+1 per RN exam, slight caput  FHT baseline 145 with recurrent late decels, moderate variability, rising baseline  Ctxns q2\", pitocin @ 13 mu    A/P: 30 yo  @ 41 1/7 weeks, PD IOL, active labor  Recurrent late decels now suggesting acidemia.  Reviewed situation with patient: concern about acidemia, elevated maternal temp. Given EFW 9 lbs, believe that delivery still at least 2 hours from now. Recommend proceeding with c/s now given growing concern for fetal intolerance to labor and concern about possible chorioamnionitis if labor to continue. Questions answered, consent signed. Will proceed with c/s now.  Kristen Kessler MD, MD on 2020 at 10:48 PM  "

## 2020-06-07 NOTE — PROGRESS NOTES
"OB  Patient is comfortable with epidural, not feeling contractions  /75   Temp 99.4  F (37.4  C) (Temporal)   Resp 18   Ht 1.702 m (5' 7\")   Wt 95.3 kg (210 lb)   SpO2 100%   BMI 32.89 kg/m       Note that most recent temps have been 100.3 and 100.8. No maternal tachycardia, No fetal tachycardia    Cx exam deferred as contractions are not adequate  Most recent exam 1930 per RN 7-8/70/0  FHT baseline 145, periods of decreased variability. Occasional late decels that have resolved.  Ctxns slightly irregular, pitocin @ 11mu. Not adequate by [place] units    A/P: 30 yo  @ 41 1/7 weeks PD IOL  Patient has received cervidil ripening or IOL and now on pitocin. Ctxns currently not adequate.   Reviewed with patient concern regarding most recent maternal temp, risk for chorio.  Reviewed with patient concern regarding FHT changes suggesting possible acidemia, intolerance of labor. Advised patient that these changes will be monitored closely. Discussed indications for c/s if chorio develops or concern or fetal acidemia continues remote from labor.   Continue expectant management.  Kristen Kessler MD, MD on 2020 at 9:08 PM  "

## 2020-06-07 NOTE — ANESTHESIA POSTPROCEDURE EVALUATION
Patient: Cristina Sorto    Procedure(s):   SECTION    Diagnosis:* No pre-op diagnosis entered *  Diagnosis Additional Information: No value filed.    Anesthesia Type:  Epidural    Note:  Anesthesia Post Evaluation    Patient location during evaluation: OB PACU  Patient participation: Able to fully participate in evaluation  Level of consciousness: awake and alert  Pain management: satisfactory to patient  Airway patency: patent  Cardiovascular status: hemodynamically stable  Respiratory status: acceptable and unassisted  Hydration status: balanced  PONV: none     Anesthetic complications: None          Last vitals:  Vitals:    20 0050 20 0055 20 0100   BP: 119/73     Pulse: 92     Resp: 13 14 (!) 6   Temp:      SpO2: 98%           Electronically Signed By: Javier Bronson MD  2020  1:16 AM

## 2020-06-07 NOTE — L&D DELIVERY NOTE
[facility]    OB Brief Operative Note    Pre-operative diagnosis: Intrauterine pregnancy at 41 1/7  weeks gestation  Failed Post Dates Induction of Labor  Fetal Intolerance of Labor   Post-operative diagnosis Same   Procedure: Procedure(s):   SECTION   Surgeon: Kristen Kessler MD, MD   Assistants(s):    Anesthesia: Epidural    Estimated blood loss: 760 cc    Total IV fluids: (See anesthesia record)  1200 ml   Blood transfusion: No transfusion was given during surgery   Total urine output: (See anesthesia record)  350 cc   Specimens: None   Findings: Live   Female  APGARS: 1 minute: 9   5 minute: 9  Weight: 10 lbs 1oz.   Tubes: Normal  Uterus: Normal  Ovaries: Normal   Complications: None   Condition: Infant stable, transferred to general care nursery  Mother stable, transfered to post-anesthesia recovery   Comments: See dictated operative report for full details [id number]

## 2020-06-07 NOTE — PROGRESS NOTES
DR son in to see pt at bedside discussed maternal temp and concern with fetal tolerance of labor due to fetal variable and late decelerations. After discussion pt and spouse agreed for  to move forward for a .  viewed efm tracing procedure explained .

## 2020-06-07 NOTE — ANESTHESIA PREPROCEDURE EVALUATION
Anesthesia Pre-Procedure Evaluation    Patient: Cristina Sorto   MRN: [id number] : 1990          Preoperative Diagnosis: * No pre-op diagnosis entered *    Procedure(s):   SECTION    Past Medical History:   Diagnosis Date     Melanoma in situ (H) 2008    left buttock--excised completely      Migraine first trimester     Thyroid disease     hypothroidism     History reviewed. No pertinent surgical history.    Anesthesia Evaluation     . Pt has had prior anesthetic.     No history of anesthetic complications          ROS/MED HX    ENT/Pulmonary:      (-) tobacco use and sleep apnea   Neurologic:     (+)migraines,    (-) seizures   Cardiovascular:        (-) hypertension   METS/Exercise Tolerance:     Hematologic:         Musculoskeletal:         GI/Hepatic:        (-) GERD and liver disease   Renal/Genitourinary:      (-) renal disease   Endo:     (+) thyroid problem hypothyroidism, .   (-) Type II DM   Psychiatric:         Infectious Disease:         Malignancy:         Other:                          Physical Exam  Normal systems: cardiovascular and dental    Airway   Mallampati: II  TM distance: >3 FB  Neck ROM: full    Dental     Cardiovascular       Pulmonary             Lab Results   Component Value Date    WBC 4.6 2013    HGB 11.5 (L) 2020    HCT 38.6 2013     2013    SED 19 2013     2012    POTASSIUM 4.6 2012    CHLORIDE 105 2012    CO2 23 2012    BUN 14 2012    CR 0.85 2012    GLC 91 2012    SURAJ 9.4 2012    ALBUMIN 4.3 2012    PROTTOTAL 8.0 2012    ALT 21 2012    AST 23 2012    ALKPHOS 52 2012    BILITOTAL 0.5 2012    TSH 3.06 2012    T4 0.75 2012    HCG Negative 2011       Preop Vitals  BP Readings from Last 3 Encounters:   20 117/75   18 (!) 131/98   18 118/78    Pulse Readings from Last 3 Encounters:   18 86  "  01/05/18 78   09/13/16 68      Resp Readings from Last 3 Encounters:   06/06/20 18   03/18/18 16   01/05/18 16    SpO2 Readings from Last 3 Encounters:   06/06/20 100%   03/18/18 98%   01/05/18 100%      Temp Readings from Last 1 Encounters:   06/06/20 37.9  C (100.2  F)    Ht Readings from Last 1 Encounters:   06/05/20 1.702 m (5' 7\")      Wt Readings from Last 1 Encounters:   06/05/20 95.3 kg (210 lb)    Estimated body mass index is 32.89 kg/m  as calculated from the following:    Height as of this encounter: 1.702 m (5' 7\").    Weight as of this encounter: 95.3 kg (210 lb).       Anesthesia Plan      History & Physical Review  History and physical reviewed and following examination; no interval change.    ASA Status:  2 .        Plan for Epidural (Epidural in situ)   PONV prophylaxis:  Ondansetron (or other 5HT-3)         Postoperative Care  Postoperative pain management:  Multi-modal analgesia.      Consents  Anesthetic plan, risks, benefits and alternatives discussed with:  Patient and Spouse..                 Javier Bronson MD  "

## 2020-06-07 NOTE — PROVIDER NOTIFICATION
06/06/20 2045   Provider Notification   Provider Name/Title Dr son   Method of Notification At Bedside;In Department   Request Evaluate in Person   Notification Reason Status Update   Dr calzada pt reviewed EFM tracing. POC to continue to increase pitocin, monitor FHR and recheck pt. Will re-evaluate after cervical exam.

## 2020-06-07 NOTE — PROGRESS NOTES
POD 1    Resting comfortably  Hernandez in place    Afebrile  Normotensive    Good UO overnight    abd appropriately tender  Incision covered  Ext nt    Rh positive  Rubella immune    Predelivery hgbs 11+/ 13+    A/p    Stable POD 1    CS / delivery time June 6 @ 5029    Routine care    Will remove hernandez later today    Will check hgb within the next 24 hours    Geovany PATEL

## 2020-06-07 NOTE — LACTATION NOTE
Initial visit with SANDY Evans and infant. Initial blood sugar 35 and infant fed well in recovery for approx 60 minutes. Following blood sugar 49. Infant breastfeeding well on left breast in cross cradle hold. Deep latch. Swallows heard. Vigorous. If supplementation needed plan for donor milk at the breast.   Breastfeeding general information reviewed.   Advised to breastfeed exclusively, on demand, avoid pacifiers, bottles and formula unless medically indicated.  Encouraged rooming in, skin to skin, feeding on demand 8-12x/day or sooner if baby cues.  Explained benefits of holding and skin to skin. Discussed typical feeding pattern for the next 24-48 hours.  Breastfeeding going well per mother. Pt has a pump for use at home.  No further questions at this time. Will follow as needed. RN updated. Parents thankful for LC support.    CARMELO Porras RN, BSN, PHN, IBCLC

## 2020-06-07 NOTE — PLAN OF CARE
Update to  on phone regarding 3 late decels in last 30 minutes, minimal variability now and temporal temp of 100.3.   Instructed to have SVE performed and updated when able.  Report to Jennifer Ingram RN to assume pt cares at 1715.

## 2020-06-07 NOTE — LACTATION NOTE
Lactation check in per RN request. Infant eager to feed but having a difficult time latching. Upon entering room, infant in left football hold, rooting around, but unable to attain latch. Recommend trying a laid back positioning and infant brought to right side with breast support and infant immediately latched herself to right breast. After suckling for approximately 10 minutes, supplementation with donor milk via feeding tube at breast. Infant took entire 12ml over 10 minutes; coordinated suckling and swallowing. Infant paces self well. After 20 minutes on right breast, left breast offered. Again, infant preferred to latch in the laid-back positioning and has coordinated suckle with intermittent audible swallows.     When hand expression demonstrated, colostrum easily expressed. Discuss normal  behavior as well as the need for supplementation d/t LGA glucose management.    Positive reinforcement given; infant with excellent feeding! Will continue to follow as needed. Recommend continued supplementation at breast as long as this works for them. TO continue pumping after each feeding session.    Eileen Larsen, RN, IBCLC

## 2020-06-08 LAB — HGB BLD-MCNC: 10.3 G/DL (ref 11.7–15.7)

## 2020-06-08 PROCEDURE — 12000035 ZZH R&B POSTPARTUM

## 2020-06-08 PROCEDURE — 85018 HEMOGLOBIN: CPT | Performed by: OBSTETRICS & GYNECOLOGY

## 2020-06-08 PROCEDURE — 25000132 ZZH RX MED GY IP 250 OP 250 PS 637: Performed by: OBSTETRICS & GYNECOLOGY

## 2020-06-08 PROCEDURE — 36415 COLL VENOUS BLD VENIPUNCTURE: CPT | Performed by: OBSTETRICS & GYNECOLOGY

## 2020-06-08 RX ADMIN — DOCUSATE SODIUM 50 MG AND SENNOSIDES 8.6 MG 2 TABLET: 8.6; 5 TABLET, FILM COATED ORAL at 08:19

## 2020-06-08 RX ADMIN — OXYCODONE HYDROCHLORIDE 5 MG: 5 TABLET ORAL at 23:18

## 2020-06-08 RX ADMIN — OXYCODONE HYDROCHLORIDE 5 MG: 5 TABLET ORAL at 11:17

## 2020-06-08 RX ADMIN — LEVOTHYROXINE SODIUM 88 MCG: 88 TABLET ORAL at 08:19

## 2020-06-08 RX ADMIN — IBUPROFEN 800 MG: 400 TABLET, FILM COATED ORAL at 15:20

## 2020-06-08 RX ADMIN — OXYCODONE HYDROCHLORIDE 5 MG: 5 TABLET ORAL at 03:32

## 2020-06-08 RX ADMIN — ACETAMINOPHEN 975 MG: 325 TABLET, FILM COATED ORAL at 09:53

## 2020-06-08 RX ADMIN — OXYCODONE HYDROCHLORIDE 5 MG: 5 TABLET ORAL at 15:20

## 2020-06-08 RX ADMIN — IBUPROFEN 800 MG: 400 TABLET, FILM COATED ORAL at 21:30

## 2020-06-08 RX ADMIN — IBUPROFEN 800 MG: 400 TABLET, FILM COATED ORAL at 08:33

## 2020-06-08 RX ADMIN — ACETAMINOPHEN 975 MG: 325 TABLET, FILM COATED ORAL at 21:30

## 2020-06-08 RX ADMIN — OXYCODONE HYDROCHLORIDE 5 MG: 5 TABLET ORAL at 19:34

## 2020-06-08 RX ADMIN — ACETAMINOPHEN 975 MG: 325 TABLET, FILM COATED ORAL at 03:31

## 2020-06-08 RX ADMIN — IBUPROFEN 800 MG: 400 TABLET, FILM COATED ORAL at 03:31

## 2020-06-08 RX ADMIN — ACETAMINOPHEN 975 MG: 325 TABLET, FILM COATED ORAL at 15:19

## 2020-06-08 NOTE — LACTATION NOTE
Routine visit with SANDY Evans and infant. RN asked LC into room. Infant not maintaining latch. Fussing at the breast. On and off without a nutritive suckle pattern. Finger fed infant 2mls of DM supplement to see if infant would calm enough to maintain latch. Infant remains fussy and not maintaining latch. Discussed nipple shield with Cristina. She is ok with trying shield. Shown how to partially invert shield nipple to place on breast. Infant quickly latched onto shield. Able to easily guide feeding tube into mouth and infant able to pull supplement through tube independently. Paced herself well. When supplement complete infant continued to suckle with nutritive suckle pattern with shield. Discussed cleaning shield between uses. Pt plans to continue pumping. Discussed weaning off of shield when mature milk comes in. Plans to follow up with [facility] at peds clinic if having any issues. RN updated.  CARMELO Porras RN, BSN, PHN, IBCLC

## 2020-06-08 NOTE — PLAN OF CARE
Vital signs stable. Ugalde catheter removed, Voiding without difficulty. Lung sounds clear and equal. Using oxycodone/Tylenol/Toradol for pain management. Up ambulating free of dizziness. Has +3 edema in her lower extremities, Using ice and elevation at times to help. Tolerating a regular diet. IV is saline locked. Working on breastfeeding every 2-3 hours. Encouraged to call with questions/concerns. Will continue to monitor.

## 2020-06-08 NOTE — PLAN OF CARE
Patient's vital signs are stable.  She is tolerating diet, ambulating and caring for the baby independently with help from spouse.  Adequate pain control with oral pain medications.  She wants nurse to offer oxycodone every 4 hours.  Incision is clean, dry and intact.  Lochia is WNL.   Patient showered today - dressing removed.

## 2020-06-08 NOTE — LACTATION NOTE
Routine visit.  Mother states breast feeding is going better now with using the nipple shield and supplementing infant with a tube and syringe at the breast.  Mother states infant is more satisfied with feeding.  Mother and Father are comfortable with plan.   Encouraged Mother to call out for assistance as needed.   No further questions at this time.  Will follow as needed.  Megan Lawler RN-IBCLC

## 2020-06-08 NOTE — PROGRESS NOTES
[facility] Obstetrics Post-Op / Progress Note         Assessment and Plan:    Assessment:   Post-operative day #2  Low transverse primary  section  L&D complications: Non-reassuring fetal heart tones      Clean wound without signs of infection.  No immediate surgical complications identified.  No excessive bleeding  Pain well-controlled.      Plan:   Anticipate discharge tomorrow           Interval History:   Doing well.  Pain is well-controlled.  No fevers.  No history of wound drainage, warmth or significant erythema.  Good appetite.  Denies chest pain, shortness of breath, nausea or vomiting.  Ambulatory.  Breastfeeding well.          Significant Problems:    None          Review of Systems:    The patient denies any chest pain, shortness of breath, excessive pain, fever, chills, purulent drainage from the wound, nausea or vomiting.          Medications:   All medications related to the patient's surgery have been reviewed          Physical Exam:     All vitals stable  Patient Vitals for the past 24 hrs:   BP Temp Temp src Heart Rate Resp SpO2   20 2318 118/72 97.5  F (36.4  C) Oral 69 -- 99 %   20 1600 113/70 97.9  F (36.6  C) Oral 80 16 99 %   20 1200 108/63 -- -- 70 16 97 %     Wound clean and dry with minimal or no drainage.  Surrounding skin with minimal erythema.          Data:     All laboratory data related to this surgery reviewed  Hemoglobin   Date Value Ref Range Status   2020 10.3 (L) 11.7 - 15.7 g/dL Final     Comment:     Delta Verified   2020 13.3 11.7 - 15.7 g/dL Final   2020 11.5 (L) 11.7 - 15.7 g/dL Final   2013 12.9 11.7 - 15.7 g/dL Final   2012 12.8 11.7 - 15.7 g/dL Final     No imaging studies have been ordered    Santana Campbell MD

## 2020-06-08 NOTE — PLAN OF CARE
VS stable.  Pain controlled well with Tyleno, Ibuprofen and Oxy.  Fundus firm and midline.  Scant vaginal bleeding.  Incision dressing has scant drainage and no new drainage since arriving on unit.  Breast feeding going well with donor milk supplement via feeding tube and now using nipple shield.

## 2020-06-09 VITALS
SYSTOLIC BLOOD PRESSURE: 121 MMHG | TEMPERATURE: 98 F | RESPIRATION RATE: 20 BRPM | WEIGHT: 210 LBS | OXYGEN SATURATION: 99 % | BODY MASS INDEX: 32.96 KG/M2 | HEIGHT: 67 IN | DIASTOLIC BLOOD PRESSURE: 81 MMHG | HEART RATE: 68 BPM

## 2020-06-09 PROCEDURE — 25000132 ZZH RX MED GY IP 250 OP 250 PS 637: Performed by: OBSTETRICS & GYNECOLOGY

## 2020-06-09 RX ORDER — MODIFIED LANOLIN
OINTMENT (GRAM) TOPICAL
Qty: 28 G | Refills: 0 | Status: ON HOLD | OUTPATIENT
Start: 2020-06-09 | End: 2023-02-15

## 2020-06-09 RX ORDER — AMOXICILLIN 250 MG
1 CAPSULE ORAL 2 TIMES DAILY
Qty: 60 TABLET | Refills: 0 | Status: ON HOLD | OUTPATIENT
Start: 2020-06-09 | End: 2023-02-15

## 2020-06-09 RX ORDER — IBUPROFEN 800 MG/1
800 TABLET, FILM COATED ORAL EVERY 6 HOURS PRN
Qty: 90 TABLET | Refills: 0 | Status: ON HOLD | OUTPATIENT
Start: 2020-06-09 | End: 2023-02-15

## 2020-06-09 RX ORDER — OXYCODONE HYDROCHLORIDE 5 MG/1
5 TABLET ORAL EVERY 4 HOURS PRN
Qty: 20 TABLET | Refills: 0 | Status: ON HOLD | OUTPATIENT
Start: 2020-06-09 | End: 2023-02-15

## 2020-06-09 RX ORDER — ACETAMINOPHEN 500 MG
1000 TABLET ORAL EVERY 6 HOURS
Qty: 90 TABLET | Refills: 0 | Status: ON HOLD | OUTPATIENT
Start: 2020-06-09 | End: 2023-02-15

## 2020-06-09 RX ADMIN — IBUPROFEN 800 MG: 400 TABLET, FILM COATED ORAL at 07:58

## 2020-06-09 RX ADMIN — OXYCODONE HYDROCHLORIDE 5 MG: 5 TABLET ORAL at 07:58

## 2020-06-09 RX ADMIN — OXYCODONE HYDROCHLORIDE 5 MG: 5 TABLET ORAL at 02:59

## 2020-06-09 RX ADMIN — DOCUSATE SODIUM 50 MG AND SENNOSIDES 8.6 MG 1 TABLET: 8.6; 5 TABLET, FILM COATED ORAL at 07:59

## 2020-06-09 RX ADMIN — LEVOTHYROXINE SODIUM 88 MCG: 88 TABLET ORAL at 07:58

## 2020-06-09 RX ADMIN — IBUPROFEN 800 MG: 400 TABLET, FILM COATED ORAL at 02:59

## 2020-06-09 RX ADMIN — ACETAMINOPHEN 975 MG: 325 TABLET, FILM COATED ORAL at 02:59

## 2020-06-09 RX ADMIN — ACETAMINOPHEN 975 MG: 325 TABLET, FILM COATED ORAL at 10:28

## 2020-06-09 NOTE — PLAN OF CARE
Patient doing well.  Pain controlled with scheduled tylenol and prn ibuprofen and oxycodone.  Up in room with steady gait.  Voiding without difficulty.  Has had 2 bowel movements this evening.  Stool softener held.  Breast feeding with use of shield and pumping after feeds.  Dad at bedside and helpful with baby needs and patient requests.

## 2020-06-09 NOTE — PLAN OF CARE
Fundus firm and bleeding wnl.  VSS.  Incision clean, dry, intact and covered with tegaderm.  Rates pain 4/10 and taking scheduled tylenol, ibuprofen and oxycodone as needed with good relief.  Pumping. Up independently.  Using abdominal binder.  3+ LE edema bilat. Passing flatus and tolerating regular diet.  Encouraged to call with questions or concerns.

## 2020-06-09 NOTE — DISCHARGE SUMMARY
[facility]    Discharge Summary  Obstetrics    Date of Admission:  2020  Date of Discharge:  2020  Discharging Provider: Kristen Kessler MD, MD  Date of Service (when I saw the patient): 20    Discharge Diagnoses   Fetal distress resolved with primary  section  Intrauterine pregnancy at 41 weeks gestation  Primary  section  Failed Postdates Induction of Labor  Acute Blood Loss anemia resulting from expected blood loss at time of surgery    Procedure/Surgery Information   Procedure: Procedure(s):   SECTION   Surgeon(s): Surgeon(s) and Role:     * Kristen Kessler MD - Primary     History of Present Illness   Cristina Sorto is a 29 year old female who presented with plan for postdates induction of labor.    Hospital Course   The patient's hospital course was unremarkable.  She recovered as anticipated and experienced no post-operative complications.  On discharge, her pain was well controlled. Vaginal bleeding is similar to peak menstrual flow.  Voiding without difficulty.  Ambulating well and tolerating a normal diet.  No fever or significant wound drainage.  Breastfeeding well.  Infant is stable.  She was discharged on post-partum day #3.    Post-partum hemoglobin:   Hemoglobin   Date Value Ref Range Status   2020 10.3 (L) 11.7 - 15.7 g/dL Final     Comment:     Delta Verified       Kristen Kessler MD, MD    Discharge Disposition   Discharged to home   Condition at discharge: Good    Pending Results   Final pathology results: No pathology submitted  These results will be followed up by   Unresulted Labs Ordered in the Past 30 Days of this Admission     No orders found from 2020 to 2020.          Primary Care Physician   Kristen Kessler MD    Consultations This Hospital Stay   ANESTHESIOLOGY IP CONSULT  HOME CARE POST PARTUM/ IP CONSULT  LACTATION IP CONSULT    Discharge Orders      Activity    Review discharge instructions      Reason for your hospital stay    Maternity care     Discharge Instructions - Postpartum visit    Schedule postpartum visit with your provider and return to clinic in 6 weeks.     Diet    Resume previous diet     Discharge Medications   Current Discharge Medication List      START taking these medications    Details   acetaminophen (TYLENOL) 500 MG tablet Take 2 tablets (1,000 mg) by mouth every 6 hours  Qty: 90 tablet, Refills: 0    Associated Diagnoses: Postpartum care following  delivery      ibuprofen (ADVIL/MOTRIN) 800 MG tablet Take 1 tablet (800 mg) by mouth every 6 hours as needed for moderate pain (mild-moderate pain)  Qty: 90 tablet, Refills: 0    Associated Diagnoses: Postpartum care following  delivery      lanolin ointment Apply topically every hour as needed for dry skin (soreness)  Qty: 28 g, Refills: 0    Associated Diagnoses: Postpartum care following  delivery      oxyCODONE (ROXICODONE) 5 MG tablet Take 1 tablet (5 mg) by mouth every 4 hours as needed for severe pain  Qty: 20 tablet, Refills: 0    Associated Diagnoses: Postpartum care following  delivery      senna-docusate (SENOKOT-S/PERICOLACE) 8.6-50 MG tablet Take 1 tablet by mouth 2 times daily  Qty: 60 tablet, Refills: 0    Associated Diagnoses: Postpartum care following  delivery         CONTINUE these medications which have NOT CHANGED    Details   levothyroxine (SYNTHROID/LEVOTHROID) 88 MCG tablet       Prenatal Vit-Fe Fumarate-FA (PNV PRENATAL PLUS MULTIVITAMIN) 27-1 MG TABS per tablet Take 1 tablet by mouth daily      SUMAtriptan (IMITREX) 50 MG tablet 1 at start of migraine ,may repeat in 1 hr  Qty: 30 tablet, Refills: 3    Associated Diagnoses: Migraine with aura and without status migrainosus, not intractable         STOP taking these medications       levonorgestrel-ethinyl estradiol (AVIANE,ALESSE,LESSINA) 0.1-20 MG-MCG per tablet Comments:   Reason for Stopping:             Allergies   No  Known Allergies

## 2020-06-09 NOTE — PROGRESS NOTES
[facility]    Post-Partum Progress Note   Obstetrics and Gynecology    Assessment & Plan     ASSESSMENT:  -3 Days Post-Op  Procedure(s):   SECTION  Prenatal course was essentially uncomplicated  Doing well.  Clean wound without signs of infection.  Normal healing wound.  No excessive bleeding  Pain well-controlled.    PLAN:  Ambulation encouraged  Breast feeding strategies discussed  Pain control measures as needed  Discharge later today    Kristen Kessler MD, MD [facility]      Interval History   The baby is well.  No problems with feeding.  Pain is adequately controlled. No immediate concerns identified.      Physical Exam   B/P: 120/78, T: 98.2, P: 65, R: 16    Uterine fundus is firm, non-tender and at the level of the umbilicus  Incision C/D/I    Data   Recent Labs   Lab Test 20   ABO A  A   RH Pos  Pos   AS Neg     Recent Labs   Lab Test 20  0816 20  2301   HGB 10.3* 13.3     No lab results found.

## 2020-06-09 NOTE — PLAN OF CARE
VSS, fundus firm with no free flow or clots.  Milk coming in, pumping every three hours.  Feels ready to go home today.  Enc to call with needs, questions and concerns.    D: VSS, assessments WDL.   I: Pt. received complete discharge paperwork and home medications as filled by discharge pharmacy here.  Pt. was given times of last dose for all discharge medications in writing on discharge medication sheets.  Discharge teaching included home medication, pain management, activity restrictions, postpartum cares, and signs and symptoms of infection.    A: Discharge outcomes on care plan met.  Mother states understanding and comfort with self and baby cares.  P: Pt. discharged to home.  Pt. was discharged with baby, and bands were checked at time of discharge.  Pt. was accompanied by , nurse and baby, and left with personal belongings.  Home care referral made.  Pt. to follow up with OB per MD order.  Pt. had no further questions at the time of discharge and no unmet needs were identified.

## 2020-06-09 NOTE — LACTATION NOTE
Routine and discharge visit. Mother states breast feeding is going well.  Mother and Father are continuing to use a tube and syringe at breast for supplementation.  Mother is able to pump 15 ml of EBM.  Discussed with Mother and Father supplementation, pumping, use of lanolin, breast feeding positions, how to make sure infant is getting a deep latch, and other general breast feeding information.  Asking appropriate questions.  Reviewed follow up with outpatient lactation consultant in pediatrician clinic.    Megan Lawler RN, IBCLC

## 2021-01-15 ENCOUNTER — HEALTH MAINTENANCE LETTER (OUTPATIENT)
Age: 31
End: 2021-01-15

## 2021-10-24 ENCOUNTER — HEALTH MAINTENANCE LETTER (OUTPATIENT)
Age: 31
End: 2021-10-24

## 2022-02-13 ENCOUNTER — HEALTH MAINTENANCE LETTER (OUTPATIENT)
Age: 32
End: 2022-02-13

## 2022-07-15 LAB
HEPATITIS B SURFACE ANTIGEN (EXTERNAL): NEGATIVE
HIV1+2 AB SERPL QL IA: NEGATIVE
RUBELLA ANTIBODY IGG (EXTERNAL): NORMAL
TREPONEMA PALLIDUM ANTIBODY (EXTERNAL): NONREACTIVE

## 2022-10-16 ENCOUNTER — HEALTH MAINTENANCE LETTER (OUTPATIENT)
Age: 32
End: 2022-10-16

## 2023-01-23 LAB — GROUP B STREPTOCOCCUS (EXTERNAL): NEGATIVE

## 2023-02-10 NOTE — H&P
HISTORY OF PRESENT ILLNESS:  The patient is a 32-year-old  2, para 1-0-0-1, who will present at 39 weeks and 6 days for elective repeat  section.  Her EDC is 2023.     PAST MEDICAL HISTORY:  Significant for hypothyroidism.  The patient currently takes levothyroxine 88 mcg per day.  The patient also has a history of migraine headaches, which has not required treatment during her pregnancy.    PAST SURGICAL HISTORY:  Previous  section in 2020.  was done at 41 weeks for failed induction, failure to progress. Maximum cervical dilation of 6 cm.  Weight of infant was 10 pounds 1 ounce.    MEDICATIONS:  Levothyroxine 88 mcg daily, daily prenatal vitamins.    ALLERGIES:  NONE.    SOCIAL HISTORY:  Negative for drug, alcohol or tobacco use.    FAMILY HISTORY:  Negative.    OB/GYN HISTORY:   2, para 1-0-0-1.  Estimated date of confinement is 2023. Dating is by last menstrual period confirmed by early OB ultrasound.    OBSTETRICAL LABS:  The patient's blood type is A positive, antibody negative, RPR negative.  Screening rubella immune, hepatitis B surface antigen negative, hepatitis C negative, RPR nonreactive, HIV negative, screening hemoglobin 12.3.  Screening platelets in the normal range at 239.  Screening.  Gonorrhea and chlamydia cultures negative.  Screening hemoglobin A1c was normal at 5.5.  This was done as the patient had previous LGA baby.  Urine culture was negative.  The patient did undergo genetic screening with this pregnancy and NIPT testing, which was normal.  The patient did opt out for fetal gender.     The patient did come in for regular prenatal care. Screen for diabetes was normal.  Anemia screening in third trimester was normal with a hemoglobin slightly decreased at 11.3 with normal platelets of 240.  GBS culture at 36 weeks was negative.  Fetal ultrasounds have been done for fetal growth, given history of macrosomia with her first pregnancy.  At 20 weeks, baby was breech presentation, posterior placenta.  Otherwise, normal ultrasounds.  Ultrasound at 32 weeks' demonstrated the infant to be vertex, estimated fetal weight 5 pounds 3 ounces, 81st percentile, normal DANI.  The next ultrasound at 36 weeks showed vertex presentation, estimated fetal weight 7 pounds 2 ounces, 82nd percentile, DANI of 14.9.     PHYSICAL EXAMINATION:  Examination was performed on 02/10/2023.  VITAL SIGNS:  Blood pressure 110/70.  LUNGS:  Clear to auscultation bilaterally.  HEART:  Regular rate and rhythm.  ABDOMEN:  Gravid, nontender.   CERVICAL EXAM:  Declined.    ASSESSMENT AND PLAN:  A 32-year-old  2, para 1-0-0-1, who will present at 39 weeks and 6 days for elective repeat  section.  1.  Admit the patient for repeat .  2.  Anesthesia consult.  3.  We will obtain CBC on presentation.    Kristen Kessler MD        D: 02/10/2023   T: 02/10/2023   MT: DIDI    Name:     RIRI ULRICHJamarcus  MRN:      [id number]        Account:     [id number]   :      1990       Document: [id number]

## 2023-02-15 ENCOUNTER — HOSPITAL ENCOUNTER (INPATIENT)
Facility: CLINIC | Age: 33
LOS: 3 days | Discharge: HOME HEALTH | End: 2023-02-19
Attending: OBSTETRICS & GYNECOLOGY | Admitting: OBSTETRICS & GYNECOLOGY
Payer: COMMERCIAL

## 2023-02-15 LAB — RUPTURE OF FETAL MEMBRANES BY ROM PLUS: POSITIVE

## 2023-02-15 PROCEDURE — 84112 EVAL AMNIOTIC FLUID PROTEIN: CPT | Performed by: OBSTETRICS & GYNECOLOGY

## 2023-02-15 RX ORDER — METOCLOPRAMIDE HYDROCHLORIDE 5 MG/ML
10 INJECTION INTRAMUSCULAR; INTRAVENOUS EVERY 6 HOURS PRN
Status: DISCONTINUED | OUTPATIENT
Start: 2023-02-15 | End: 2023-02-16 | Stop reason: HOSPADM

## 2023-02-15 RX ORDER — METOCLOPRAMIDE 10 MG/1
10 TABLET ORAL EVERY 6 HOURS PRN
Status: DISCONTINUED | OUTPATIENT
Start: 2023-02-15 | End: 2023-02-16 | Stop reason: HOSPADM

## 2023-02-15 RX ORDER — PROCHLORPERAZINE MALEATE 5 MG
10 TABLET ORAL EVERY 6 HOURS PRN
Status: DISCONTINUED | OUTPATIENT
Start: 2023-02-15 | End: 2023-02-16 | Stop reason: HOSPADM

## 2023-02-15 RX ORDER — ONDANSETRON 4 MG/1
4 TABLET, ORALLY DISINTEGRATING ORAL EVERY 6 HOURS PRN
Status: DISCONTINUED | OUTPATIENT
Start: 2023-02-15 | End: 2023-02-16 | Stop reason: HOSPADM

## 2023-02-15 RX ORDER — ONDANSETRON 2 MG/ML
4 INJECTION INTRAMUSCULAR; INTRAVENOUS EVERY 6 HOURS PRN
Status: DISCONTINUED | OUTPATIENT
Start: 2023-02-15 | End: 2023-02-16 | Stop reason: HOSPADM

## 2023-02-15 RX ORDER — PROCHLORPERAZINE 25 MG
25 SUPPOSITORY, RECTAL RECTAL EVERY 12 HOURS PRN
Status: DISCONTINUED | OUTPATIENT
Start: 2023-02-15 | End: 2023-02-16 | Stop reason: HOSPADM

## 2023-02-15 RX ORDER — KETOCONAZOLE 20 MG/G
CREAM TOPICAL DAILY
Status: ON HOLD | COMMUNITY
End: 2023-02-18

## 2023-02-16 ENCOUNTER — ANESTHESIA EVENT (OUTPATIENT)
Dept: OBGYN | Facility: CLINIC | Age: 33
End: 2023-02-16
Payer: COMMERCIAL

## 2023-02-16 ENCOUNTER — ANESTHESIA (OUTPATIENT)
Dept: OBGYN | Facility: CLINIC | Age: 33
End: 2023-02-16
Payer: COMMERCIAL

## 2023-02-16 LAB
ABO/RH(D): NORMAL
ANTIBODY SCREEN: NEGATIVE
ERYTHROCYTE [DISTWIDTH] IN BLOOD BY AUTOMATED COUNT: 13.7 % (ref 10–15)
ERYTHROCYTE [DISTWIDTH] IN BLOOD BY AUTOMATED COUNT: 13.9 % (ref 10–15)
HCT VFR BLD AUTO: 36.3 % (ref 35–47)
HCT VFR BLD AUTO: 38.4 % (ref 35–47)
HGB BLD-MCNC: 11.6 G/DL (ref 11.7–15.7)
HGB BLD-MCNC: 12.4 G/DL (ref 11.7–15.7)
MCH RBC QN AUTO: 29.5 PG (ref 26.5–33)
MCH RBC QN AUTO: 29.7 PG (ref 26.5–33)
MCHC RBC AUTO-ENTMCNC: 32 G/DL (ref 31.5–36.5)
MCHC RBC AUTO-ENTMCNC: 32.3 G/DL (ref 31.5–36.5)
MCV RBC AUTO: 92 FL (ref 78–100)
MCV RBC AUTO: 92 FL (ref 78–100)
PLATELET # BLD AUTO: 270 10E3/UL (ref 150–450)
PLATELET # BLD AUTO: 274 10E3/UL (ref 150–450)
RBC # BLD AUTO: 3.93 10E6/UL (ref 3.8–5.2)
RBC # BLD AUTO: 4.18 10E6/UL (ref 3.8–5.2)
SPECIMEN EXPIRATION DATE: NORMAL
T PALLIDUM AB SER QL: NONREACTIVE
T PALLIDUM AB SER QL: NONREACTIVE
WBC # BLD AUTO: 7.7 10E3/UL (ref 4–11)
WBC # BLD AUTO: 8.3 10E3/UL (ref 4–11)

## 2023-02-16 PROCEDURE — 250N000011 HC RX IP 250 OP 636: Performed by: NURSE ANESTHETIST, CERTIFIED REGISTERED

## 2023-02-16 PROCEDURE — 250N000013 HC RX MED GY IP 250 OP 250 PS 637: Performed by: OBSTETRICS & GYNECOLOGY

## 2023-02-16 PROCEDURE — 250N000011 HC RX IP 250 OP 636: Performed by: OBSTETRICS & GYNECOLOGY

## 2023-02-16 PROCEDURE — G0463 HOSPITAL OUTPT CLINIC VISIT: HCPCS | Mod: 25

## 2023-02-16 PROCEDURE — 86780 TREPONEMA PALLIDUM: CPT | Performed by: OBSTETRICS & GYNECOLOGY

## 2023-02-16 PROCEDURE — 36415 COLL VENOUS BLD VENIPUNCTURE: CPT | Performed by: OBSTETRICS & GYNECOLOGY

## 2023-02-16 PROCEDURE — 59025 FETAL NON-STRESS TEST: CPT

## 2023-02-16 PROCEDURE — 250N000009 HC RX 250: Performed by: NURSE ANESTHETIST, CERTIFIED REGISTERED

## 2023-02-16 PROCEDURE — 272N000001 HC OR GENERAL SUPPLY STERILE: Performed by: OBSTETRICS & GYNECOLOGY

## 2023-02-16 PROCEDURE — 86901 BLOOD TYPING SEROLOGIC RH(D): CPT | Performed by: OBSTETRICS & GYNECOLOGY

## 2023-02-16 PROCEDURE — 250N000011 HC RX IP 250 OP 636

## 2023-02-16 PROCEDURE — 85027 COMPLETE CBC AUTOMATED: CPT | Performed by: OBSTETRICS & GYNECOLOGY

## 2023-02-16 PROCEDURE — 370N000017 HC ANESTHESIA TECHNICAL FEE, PER MIN: Performed by: OBSTETRICS & GYNECOLOGY

## 2023-02-16 PROCEDURE — 120N000012 HC R&B POSTPARTUM

## 2023-02-16 PROCEDURE — 710N000009 HC RECOVERY PHASE 1, LEVEL 1, PER MIN: Performed by: OBSTETRICS & GYNECOLOGY

## 2023-02-16 PROCEDURE — 258N000003 HC RX IP 258 OP 636: Performed by: OBSTETRICS & GYNECOLOGY

## 2023-02-16 PROCEDURE — 85041 AUTOMATED RBC COUNT: CPT | Performed by: OBSTETRICS & GYNECOLOGY

## 2023-02-16 PROCEDURE — 360N000076 HC SURGERY LEVEL 3, PER MIN: Performed by: OBSTETRICS & GYNECOLOGY

## 2023-02-16 PROCEDURE — 10907ZC DRAINAGE OF AMNIOTIC FLUID, THERAPEUTIC FROM PRODUCTS OF CONCEPTION, VIA NATURAL OR ARTIFICIAL OPENING: ICD-10-PCS | Performed by: OBSTETRICS & GYNECOLOGY

## 2023-02-16 PROCEDURE — 258N000003 HC RX IP 258 OP 636: Performed by: NURSE ANESTHETIST, CERTIFIED REGISTERED

## 2023-02-16 RX ORDER — BUPIVACAINE HYDROCHLORIDE 7.5 MG/ML
INJECTION, SOLUTION INTRASPINAL PRN
Status: DISCONTINUED | OUTPATIENT
Start: 2023-02-16 | End: 2023-02-16

## 2023-02-16 RX ORDER — MISOPROSTOL 200 UG/1
800 TABLET ORAL
Status: DISCONTINUED | OUTPATIENT
Start: 2023-02-16 | End: 2023-02-16 | Stop reason: HOSPADM

## 2023-02-16 RX ORDER — MISOPROSTOL 200 UG/1
400 TABLET ORAL
Status: DISCONTINUED | OUTPATIENT
Start: 2023-02-16 | End: 2023-02-16 | Stop reason: HOSPADM

## 2023-02-16 RX ORDER — FENTANYL CITRATE 50 UG/ML
INJECTION, SOLUTION INTRAMUSCULAR; INTRAVENOUS PRN
Status: DISCONTINUED | OUTPATIENT
Start: 2023-02-16 | End: 2023-02-16

## 2023-02-16 RX ORDER — METHYLERGONOVINE MALEATE 0.2 MG/ML
200 INJECTION INTRAVENOUS
Status: DISCONTINUED | OUTPATIENT
Start: 2023-02-16 | End: 2023-02-16 | Stop reason: HOSPADM

## 2023-02-16 RX ORDER — ONDANSETRON 2 MG/ML
4 INJECTION INTRAMUSCULAR; INTRAVENOUS EVERY 6 HOURS PRN
Status: DISCONTINUED | OUTPATIENT
Start: 2023-02-16 | End: 2023-02-19 | Stop reason: HOSPADM

## 2023-02-16 RX ORDER — OXYCODONE HYDROCHLORIDE 5 MG/1
5 TABLET ORAL EVERY 4 HOURS PRN
Status: DISCONTINUED | OUTPATIENT
Start: 2023-02-16 | End: 2023-02-19 | Stop reason: HOSPADM

## 2023-02-16 RX ORDER — OXYTOCIN 10 [USP'U]/ML
10 INJECTION, SOLUTION INTRAMUSCULAR; INTRAVENOUS
Status: DISCONTINUED | OUTPATIENT
Start: 2023-02-16 | End: 2023-02-19 | Stop reason: HOSPADM

## 2023-02-16 RX ORDER — METOCLOPRAMIDE HYDROCHLORIDE 5 MG/ML
10 INJECTION INTRAMUSCULAR; INTRAVENOUS EVERY 6 HOURS PRN
Status: DISCONTINUED | OUTPATIENT
Start: 2023-02-16 | End: 2023-02-19 | Stop reason: HOSPADM

## 2023-02-16 RX ORDER — CITRIC ACID/SODIUM CITRATE 334-500MG
30 SOLUTION, ORAL ORAL
Status: COMPLETED | OUTPATIENT
Start: 2023-02-16 | End: 2023-02-16

## 2023-02-16 RX ORDER — TRANEXAMIC ACID 10 MG/ML
1 INJECTION, SOLUTION INTRAVENOUS EVERY 30 MIN PRN
Status: DISCONTINUED | OUTPATIENT
Start: 2023-02-16 | End: 2023-02-16 | Stop reason: HOSPADM

## 2023-02-16 RX ORDER — FENTANYL CITRATE 50 UG/ML
100 INJECTION, SOLUTION INTRAMUSCULAR; INTRAVENOUS
Status: DISCONTINUED | OUTPATIENT
Start: 2023-02-16 | End: 2023-02-16 | Stop reason: HOSPADM

## 2023-02-16 RX ORDER — PROCHLORPERAZINE 25 MG
25 SUPPOSITORY, RECTAL RECTAL EVERY 12 HOURS PRN
Status: DISCONTINUED | OUTPATIENT
Start: 2023-02-16 | End: 2023-02-16 | Stop reason: HOSPADM

## 2023-02-16 RX ORDER — METOCLOPRAMIDE 10 MG/1
10 TABLET ORAL EVERY 6 HOURS PRN
Status: DISCONTINUED | OUTPATIENT
Start: 2023-02-16 | End: 2023-02-16 | Stop reason: HOSPADM

## 2023-02-16 RX ORDER — ONDANSETRON 2 MG/ML
4 INJECTION INTRAMUSCULAR; INTRAVENOUS EVERY 6 HOURS PRN
Status: DISCONTINUED | OUTPATIENT
Start: 2023-02-16 | End: 2023-02-16 | Stop reason: HOSPADM

## 2023-02-16 RX ORDER — SODIUM CHLORIDE, SODIUM LACTATE, POTASSIUM CHLORIDE, CALCIUM CHLORIDE 600; 310; 30; 20 MG/100ML; MG/100ML; MG/100ML; MG/100ML
INJECTION, SOLUTION INTRAVENOUS CONTINUOUS
Status: DISCONTINUED | OUTPATIENT
Start: 2023-02-16 | End: 2023-02-16 | Stop reason: HOSPADM

## 2023-02-16 RX ORDER — AZITHROMYCIN 500 MG/1
500 INJECTION, POWDER, LYOPHILIZED, FOR SOLUTION INTRAVENOUS
Status: COMPLETED | OUTPATIENT
Start: 2023-02-16 | End: 2023-02-16

## 2023-02-16 RX ORDER — HYDROMORPHONE HCL IN WATER/PF 6 MG/30 ML
0.2 PATIENT CONTROLLED ANALGESIA SYRINGE INTRAVENOUS
Status: DISCONTINUED | OUTPATIENT
Start: 2023-02-16 | End: 2023-02-19 | Stop reason: HOSPADM

## 2023-02-16 RX ORDER — PROCHLORPERAZINE 25 MG
25 SUPPOSITORY, RECTAL RECTAL EVERY 12 HOURS PRN
Status: DISCONTINUED | OUTPATIENT
Start: 2023-02-16 | End: 2023-02-19 | Stop reason: HOSPADM

## 2023-02-16 RX ORDER — KETOROLAC TROMETHAMINE 30 MG/ML
30 INJECTION, SOLUTION INTRAMUSCULAR; INTRAVENOUS
Status: DISCONTINUED | OUTPATIENT
Start: 2023-02-16 | End: 2023-02-16

## 2023-02-16 RX ORDER — METOCLOPRAMIDE HYDROCHLORIDE 5 MG/ML
10 INJECTION INTRAMUSCULAR; INTRAVENOUS EVERY 6 HOURS PRN
Status: DISCONTINUED | OUTPATIENT
Start: 2023-02-16 | End: 2023-02-16 | Stop reason: HOSPADM

## 2023-02-16 RX ORDER — OXYTOCIN/0.9 % SODIUM CHLORIDE 30/500 ML
340 PLASTIC BAG, INJECTION (ML) INTRAVENOUS CONTINUOUS PRN
Status: DISCONTINUED | OUTPATIENT
Start: 2023-02-16 | End: 2023-02-16 | Stop reason: HOSPADM

## 2023-02-16 RX ORDER — OXYTOCIN/0.9 % SODIUM CHLORIDE 30/500 ML
PLASTIC BAG, INJECTION (ML) INTRAVENOUS CONTINUOUS PRN
Status: DISCONTINUED | OUTPATIENT
Start: 2023-02-16 | End: 2023-02-16

## 2023-02-16 RX ORDER — KETOROLAC TROMETHAMINE 30 MG/ML
30 INJECTION, SOLUTION INTRAMUSCULAR; INTRAVENOUS EVERY 6 HOURS
Status: COMPLETED | OUTPATIENT
Start: 2023-02-16 | End: 2023-02-17

## 2023-02-16 RX ORDER — AZITHROMYCIN 500 MG/1
INJECTION, POWDER, LYOPHILIZED, FOR SOLUTION INTRAVENOUS
Status: COMPLETED
Start: 2023-02-16 | End: 2023-02-16

## 2023-02-16 RX ORDER — ONDANSETRON 4 MG/1
4 TABLET, ORALLY DISINTEGRATING ORAL EVERY 6 HOURS PRN
Status: DISCONTINUED | OUTPATIENT
Start: 2023-02-16 | End: 2023-02-19 | Stop reason: HOSPADM

## 2023-02-16 RX ORDER — LIDOCAINE 40 MG/G
CREAM TOPICAL
Status: DISCONTINUED | OUTPATIENT
Start: 2023-02-16 | End: 2023-02-19 | Stop reason: HOSPADM

## 2023-02-16 RX ORDER — IBUPROFEN 400 MG/1
800 TABLET, FILM COATED ORAL
Status: DISCONTINUED | OUTPATIENT
Start: 2023-02-16 | End: 2023-02-16

## 2023-02-16 RX ORDER — MORPHINE SULFATE 1 MG/ML
INJECTION, SOLUTION EPIDURAL; INTRATHECAL; INTRAVENOUS PRN
Status: DISCONTINUED | OUTPATIENT
Start: 2023-02-16 | End: 2023-02-16

## 2023-02-16 RX ORDER — SIMETHICONE 80 MG
80 TABLET,CHEWABLE ORAL 4 TIMES DAILY PRN
Status: DISCONTINUED | OUTPATIENT
Start: 2023-02-16 | End: 2023-02-19 | Stop reason: HOSPADM

## 2023-02-16 RX ORDER — LIDOCAINE 40 MG/G
CREAM TOPICAL
Status: DISCONTINUED | OUTPATIENT
Start: 2023-02-16 | End: 2023-02-16 | Stop reason: HOSPADM

## 2023-02-16 RX ORDER — OXYTOCIN 10 [USP'U]/ML
10 INJECTION, SOLUTION INTRAMUSCULAR; INTRAVENOUS
Status: DISCONTINUED | OUTPATIENT
Start: 2023-02-16 | End: 2023-02-16 | Stop reason: HOSPADM

## 2023-02-16 RX ORDER — NALOXONE HYDROCHLORIDE 0.4 MG/ML
0.4 INJECTION, SOLUTION INTRAMUSCULAR; INTRAVENOUS; SUBCUTANEOUS
Status: DISCONTINUED | OUTPATIENT
Start: 2023-02-16 | End: 2023-02-19 | Stop reason: HOSPADM

## 2023-02-16 RX ORDER — CEFAZOLIN SODIUM 2 G/100ML
2 INJECTION, SOLUTION INTRAVENOUS SEE ADMIN INSTRUCTIONS
Status: DISCONTINUED | OUTPATIENT
Start: 2023-02-16 | End: 2023-02-16 | Stop reason: HOSPADM

## 2023-02-16 RX ORDER — ACETAMINOPHEN 325 MG/1
975 TABLET ORAL ONCE
Status: COMPLETED | OUTPATIENT
Start: 2023-02-16 | End: 2023-02-16

## 2023-02-16 RX ORDER — TRANEXAMIC ACID 10 MG/ML
1 INJECTION, SOLUTION INTRAVENOUS EVERY 30 MIN PRN
Status: DISCONTINUED | OUTPATIENT
Start: 2023-02-16 | End: 2023-02-19 | Stop reason: HOSPADM

## 2023-02-16 RX ORDER — NALOXONE HYDROCHLORIDE 0.4 MG/ML
0.2 INJECTION, SOLUTION INTRAMUSCULAR; INTRAVENOUS; SUBCUTANEOUS
Status: DISCONTINUED | OUTPATIENT
Start: 2023-02-16 | End: 2023-02-16 | Stop reason: HOSPADM

## 2023-02-16 RX ORDER — CARBOPROST TROMETHAMINE 250 UG/ML
250 INJECTION, SOLUTION INTRAMUSCULAR
Status: DISCONTINUED | OUTPATIENT
Start: 2023-02-16 | End: 2023-02-16 | Stop reason: HOSPADM

## 2023-02-16 RX ORDER — HYDROCORTISONE 25 MG/G
CREAM TOPICAL 3 TIMES DAILY PRN
Status: DISCONTINUED | OUTPATIENT
Start: 2023-02-16 | End: 2023-02-19 | Stop reason: HOSPADM

## 2023-02-16 RX ORDER — MODIFIED LANOLIN
OINTMENT (GRAM) TOPICAL
Status: DISCONTINUED | OUTPATIENT
Start: 2023-02-16 | End: 2023-02-19 | Stop reason: HOSPADM

## 2023-02-16 RX ORDER — OXYTOCIN/0.9 % SODIUM CHLORIDE 30/500 ML
340 PLASTIC BAG, INJECTION (ML) INTRAVENOUS CONTINUOUS PRN
Status: DISCONTINUED | OUTPATIENT
Start: 2023-02-16 | End: 2023-02-19 | Stop reason: HOSPADM

## 2023-02-16 RX ORDER — OXYTOCIN/0.9 % SODIUM CHLORIDE 30/500 ML
100-340 PLASTIC BAG, INJECTION (ML) INTRAVENOUS CONTINUOUS PRN
Status: DISCONTINUED | OUTPATIENT
Start: 2023-02-16 | End: 2023-02-16

## 2023-02-16 RX ORDER — MISOPROSTOL 200 UG/1
400 TABLET ORAL
Status: DISCONTINUED | OUTPATIENT
Start: 2023-02-16 | End: 2023-02-19 | Stop reason: HOSPADM

## 2023-02-16 RX ORDER — NALOXONE HYDROCHLORIDE 0.4 MG/ML
0.2 INJECTION, SOLUTION INTRAMUSCULAR; INTRAVENOUS; SUBCUTANEOUS
Status: DISCONTINUED | OUTPATIENT
Start: 2023-02-16 | End: 2023-02-19 | Stop reason: HOSPADM

## 2023-02-16 RX ORDER — AMOXICILLIN 250 MG
2 CAPSULE ORAL 2 TIMES DAILY
Status: DISCONTINUED | OUTPATIENT
Start: 2023-02-16 | End: 2023-02-19 | Stop reason: HOSPADM

## 2023-02-16 RX ORDER — PROCHLORPERAZINE MALEATE 10 MG
10 TABLET ORAL EVERY 6 HOURS PRN
Status: DISCONTINUED | OUTPATIENT
Start: 2023-02-16 | End: 2023-02-19 | Stop reason: HOSPADM

## 2023-02-16 RX ORDER — IBUPROFEN 600 MG/1
600 TABLET, FILM COATED ORAL EVERY 6 HOURS
Status: DISCONTINUED | OUTPATIENT
Start: 2023-02-17 | End: 2023-02-19 | Stop reason: HOSPADM

## 2023-02-16 RX ORDER — OXYTOCIN 10 [USP'U]/ML
10 INJECTION, SOLUTION INTRAMUSCULAR; INTRAVENOUS
Status: DISCONTINUED | OUTPATIENT
Start: 2023-02-16 | End: 2023-02-16

## 2023-02-16 RX ORDER — DEXTROSE, SODIUM CHLORIDE, SODIUM LACTATE, POTASSIUM CHLORIDE, AND CALCIUM CHLORIDE 5; .6; .31; .03; .02 G/100ML; G/100ML; G/100ML; G/100ML; G/100ML
INJECTION, SOLUTION INTRAVENOUS CONTINUOUS
Status: DISCONTINUED | OUTPATIENT
Start: 2023-02-16 | End: 2023-02-19 | Stop reason: HOSPADM

## 2023-02-16 RX ORDER — AMOXICILLIN 250 MG
1 CAPSULE ORAL 2 TIMES DAILY
Status: DISCONTINUED | OUTPATIENT
Start: 2023-02-16 | End: 2023-02-19 | Stop reason: HOSPADM

## 2023-02-16 RX ORDER — CITRIC ACID/SODIUM CITRATE 334-500MG
30 SOLUTION, ORAL ORAL
Status: DISCONTINUED | OUTPATIENT
Start: 2023-02-16 | End: 2023-02-16 | Stop reason: HOSPADM

## 2023-02-16 RX ORDER — FENTANYL CITRATE-0.9 % NACL/PF 10 MCG/ML
100 PLASTIC BAG, INJECTION (ML) INTRAVENOUS EVERY 5 MIN PRN
Status: DISCONTINUED | OUTPATIENT
Start: 2023-02-16 | End: 2023-02-16 | Stop reason: HOSPADM

## 2023-02-16 RX ORDER — ACETAMINOPHEN 500 MG
1000 TABLET ORAL EVERY 6 HOURS
Status: DISCONTINUED | OUTPATIENT
Start: 2023-02-16 | End: 2023-02-19 | Stop reason: HOSPADM

## 2023-02-16 RX ORDER — METHYLERGONOVINE MALEATE 0.2 MG/ML
200 INJECTION INTRAVENOUS
Status: DISCONTINUED | OUTPATIENT
Start: 2023-02-16 | End: 2023-02-19 | Stop reason: HOSPADM

## 2023-02-16 RX ORDER — MISOPROSTOL 200 UG/1
800 TABLET ORAL
Status: DISCONTINUED | OUTPATIENT
Start: 2023-02-16 | End: 2023-02-19 | Stop reason: HOSPADM

## 2023-02-16 RX ORDER — METOCLOPRAMIDE 10 MG/1
10 TABLET ORAL EVERY 6 HOURS PRN
Status: DISCONTINUED | OUTPATIENT
Start: 2023-02-16 | End: 2023-02-19 | Stop reason: HOSPADM

## 2023-02-16 RX ORDER — CEFAZOLIN SODIUM 2 G/100ML
2 INJECTION, SOLUTION INTRAVENOUS
Status: COMPLETED | OUTPATIENT
Start: 2023-02-16 | End: 2023-02-16

## 2023-02-16 RX ORDER — PROCHLORPERAZINE MALEATE 5 MG
10 TABLET ORAL EVERY 6 HOURS PRN
Status: DISCONTINUED | OUTPATIENT
Start: 2023-02-16 | End: 2023-02-16 | Stop reason: HOSPADM

## 2023-02-16 RX ORDER — LEVOTHYROXINE SODIUM 112 UG/1
112 TABLET ORAL DAILY
Status: DISCONTINUED | OUTPATIENT
Start: 2023-02-16 | End: 2023-02-17

## 2023-02-16 RX ORDER — ONDANSETRON 4 MG/1
4 TABLET, ORALLY DISINTEGRATING ORAL EVERY 6 HOURS PRN
Status: DISCONTINUED | OUTPATIENT
Start: 2023-02-16 | End: 2023-02-16 | Stop reason: HOSPADM

## 2023-02-16 RX ORDER — CITRIC ACID/SODIUM CITRATE 334-500MG
30 SOLUTION, ORAL ORAL ONCE
Status: DISCONTINUED | OUTPATIENT
Start: 2023-02-16 | End: 2023-02-16 | Stop reason: HOSPADM

## 2023-02-16 RX ORDER — BISACODYL 10 MG
10 SUPPOSITORY, RECTAL RECTAL DAILY PRN
Status: DISCONTINUED | OUTPATIENT
Start: 2023-02-18 | End: 2023-02-19 | Stop reason: HOSPADM

## 2023-02-16 RX ORDER — NALOXONE HYDROCHLORIDE 0.4 MG/ML
0.4 INJECTION, SOLUTION INTRAMUSCULAR; INTRAVENOUS; SUBCUTANEOUS
Status: DISCONTINUED | OUTPATIENT
Start: 2023-02-16 | End: 2023-02-16 | Stop reason: HOSPADM

## 2023-02-16 RX ORDER — ONDANSETRON 2 MG/ML
INJECTION INTRAMUSCULAR; INTRAVENOUS PRN
Status: DISCONTINUED | OUTPATIENT
Start: 2023-02-16 | End: 2023-02-16

## 2023-02-16 RX ORDER — CARBOPROST TROMETHAMINE 250 UG/ML
250 INJECTION, SOLUTION INTRAMUSCULAR
Status: DISCONTINUED | OUTPATIENT
Start: 2023-02-16 | End: 2023-02-19 | Stop reason: HOSPADM

## 2023-02-16 RX ORDER — NALBUPHINE HYDROCHLORIDE 10 MG/ML
2.5-5 INJECTION, SOLUTION INTRAMUSCULAR; INTRAVENOUS; SUBCUTANEOUS EVERY 6 HOURS PRN
Status: DISCONTINUED | OUTPATIENT
Start: 2023-02-16 | End: 2023-02-19 | Stop reason: HOSPADM

## 2023-02-16 RX ORDER — ACETAMINOPHEN 325 MG/1
650 TABLET ORAL EVERY 4 HOURS PRN
Status: DISCONTINUED | OUTPATIENT
Start: 2023-02-16 | End: 2023-02-16 | Stop reason: HOSPADM

## 2023-02-16 RX ADMIN — SENNOSIDES AND DOCUSATE SODIUM 1 TABLET: 50; 8.6 TABLET ORAL at 22:37

## 2023-02-16 RX ADMIN — PHENYLEPHRINE HYDROCHLORIDE 100 MCG: 10 INJECTION INTRAVENOUS at 15:40

## 2023-02-16 RX ADMIN — ACETAMINOPHEN 975 MG: 325 TABLET, FILM COATED ORAL at 12:59

## 2023-02-16 RX ADMIN — SODIUM CHLORIDE, SODIUM LACTATE, POTASSIUM CHLORIDE, CALCIUM CHLORIDE AND DEXTROSE MONOHYDRATE: 5; 600; 310; 30; 20 INJECTION, SOLUTION INTRAVENOUS at 19:10

## 2023-02-16 RX ADMIN — ACETAMINOPHEN 1000 MG: 500 TABLET ORAL at 18:59

## 2023-02-16 RX ADMIN — PHENYLEPHRINE HYDROCHLORIDE 100 MCG: 10 INJECTION INTRAVENOUS at 15:05

## 2023-02-16 RX ADMIN — KETOROLAC TROMETHAMINE 30 MG: 30 INJECTION, SOLUTION INTRAMUSCULAR at 22:37

## 2023-02-16 RX ADMIN — Medication 340 ML/HR: at 15:01

## 2023-02-16 RX ADMIN — ONDANSETRON 4 MG: 2 INJECTION INTRAMUSCULAR; INTRAVENOUS at 14:31

## 2023-02-16 RX ADMIN — PHENYLEPHRINE HYDROCHLORIDE 150 MCG: 10 INJECTION INTRAVENOUS at 15:15

## 2023-02-16 RX ADMIN — CEFAZOLIN SODIUM 2 G: 2 INJECTION, SOLUTION INTRAVENOUS at 14:28

## 2023-02-16 RX ADMIN — BUPIVACAINE HYDROCHLORIDE IN DEXTROSE 12 MG: 7.5 INJECTION, SOLUTION SUBARACHNOID at 14:39

## 2023-02-16 RX ADMIN — LEVOTHYROXINE SODIUM 112 MCG: 112 TABLET ORAL at 08:38

## 2023-02-16 RX ADMIN — AZITHROMYCIN MONOHYDRATE 500 MG: 500 INJECTION, POWDER, LYOPHILIZED, FOR SOLUTION INTRAVENOUS at 13:05

## 2023-02-16 RX ADMIN — PHENYLEPHRINE HYDROCHLORIDE 150 MCG: 10 INJECTION INTRAVENOUS at 15:08

## 2023-02-16 RX ADMIN — AZITHROMYCIN 500 MG: 500 INJECTION, POWDER, LYOPHILIZED, FOR SOLUTION INTRAVENOUS at 13:05

## 2023-02-16 RX ADMIN — PHENYLEPHRINE HYDROCHLORIDE 0.5 MCG/KG/MIN: 10 INJECTION INTRAVENOUS at 14:39

## 2023-02-16 RX ADMIN — PHENYLEPHRINE HYDROCHLORIDE 100 MCG: 10 INJECTION INTRAVENOUS at 14:44

## 2023-02-16 RX ADMIN — PHENYLEPHRINE HYDROCHLORIDE 100 MCG: 10 INJECTION INTRAVENOUS at 15:34

## 2023-02-16 RX ADMIN — SODIUM CITRATE AND CITRIC ACID MONOHYDRATE 30 ML: 500; 334 SOLUTION ORAL at 13:52

## 2023-02-16 RX ADMIN — FENTANYL CITRATE 15 MCG: 50 INJECTION, SOLUTION INTRAMUSCULAR; INTRAVENOUS at 14:39

## 2023-02-16 RX ADMIN — MORPHINE SULFATE 0.15 MG: 1 INJECTION, SOLUTION EPIDURAL; INTRATHECAL; INTRAVENOUS at 14:39

## 2023-02-16 RX ADMIN — SODIUM CHLORIDE, POTASSIUM CHLORIDE, SODIUM LACTATE AND CALCIUM CHLORIDE: 600; 310; 30; 20 INJECTION, SOLUTION INTRAVENOUS at 12:36

## 2023-02-16 ASSESSMENT — ACTIVITIES OF DAILY LIVING (ADL)
ADLS_ACUITY_SCORE: 20

## 2023-02-16 NOTE — ANESTHESIA CARE TRANSFER NOTE
Patient: Cristina Sorto    Procedure: Procedure(s):  REPEAT  SECTION       Diagnosis: Previous  section [Z98.891]  Diagnosis Additional Information: No value filed.    Anesthesia Type:   Spinal     Note:    Oropharynx: oropharynx clear of all foreign objects  Level of Consciousness: awake  Oxygen Supplementation: room air    Independent Airway: airway patency satisfactory and stable  Dentition: dentition unchanged  Vital Signs Stable: post-procedure vital signs reviewed and stable  Report to RN Given: handoff report given  Patient transferred to: Labor and Delivery    Handoff Report: Identifed the Patient, Identified the Reponsible Provider, Reviewed the pertinent medical history, Discussed the surgical course, Reviewed Intra-OP anesthesia mangement and issues during anesthesia, Set expectations for post-procedure period and Allowed opportunity for questions and acknowledgement of understanding      Vitals:  Vitals Value Taken Time   BP     Temp     Pulse     Resp     SpO2         Electronically Signed By: JACLKYN Vigil CRNA  2023  3:45 PM

## 2023-02-16 NOTE — ANESTHESIA POSTPROCEDURE EVALUATION
Patient: Cristina Sorto    Procedure: Procedure(s):  REPEAT  SECTION       Anesthesia Type:  Spinal    Note:  Disposition: Inpatient   Postop Pain Control: Uneventful            Sign Out: Well controlled pain   PONV: No   Neuro/Psych: Uneventful            Sign Out: Acceptable/Baseline neuro status   Airway/Respiratory: Uneventful            Sign Out: Acceptable/Baseline resp. status   CV/Hemodynamics: Uneventful            Sign Out: Acceptable CV status; No obvious hypovolemia; No obvious fluid overload   Other NRE: NONE   DID A NON-ROUTINE EVENT OCCUR? No           Last vitals:  Vitals Value Taken Time   BP 97/54 23 1645   Temp 36.4  C (97.6  F) 23 1600   Pulse 86 23 1650   Resp 14 23 1650   SpO2 95 % 23 1650   Vitals shown include unvalidated device data.    Electronically Signed By: Boo Marroquin MD  2023  4:52 PM   yes/knee pain

## 2023-02-16 NOTE — PLAN OF CARE
Dr Audie BOYER updated on hypotension in PACU. Pt asymptomatic, normal heart rate. No new orders at this time, will monitor BP and symptoms and keep pt flat for a while longer.

## 2023-02-16 NOTE — ANESTHESIA PROCEDURE NOTES
"Intrathecal Procedure Note    Pre-Procedure   Staff -        Anesthesiologist:  Daniel Baird MD       Performed By: Anesthesiologist       Location: OB       Pre-Anesthestic Checklist: patient identified, IV checked, site marked, risks and benefits discussed, informed consent, monitors and equipment checked, pre-op evaluation, at physician/surgeon's request and post-op pain management  Timeout:       Correct Patient: Yes        Correct Procedure: Yes        Correct Site: Yes        Correct Position: Yes   Procedure Documentation  Procedure: intrathecal       Patient Position: sitting       Skin prep: Betadine       Insertion Site: L3-4. (midline approach).       Needle Gauge: 24.        Needle Length (Inches): 4        Spinal Needle Type: Pencan       Introducer used       Introducer: 20 G       # of attempts: 1 and  # of redirects:     Assessment/Narrative         Paresthesias: No.       CSF fluid: clear.       Opening pressure was cmH2O while  Sitting.       Comments:  Patient sitting on edge of OR bed, lower back cleaned and prepped in sterile fashion with betadine. 1% lido used to numb area. Introducer placed, spinal needle through introducer. Appropriate flow of CSF and confirmed with aspiration via syringe. Spinal dose given, 12 mg 0.75% bupivacaine with dextrose, 150mcg morphine, 15mcg fentanyl. No complications.       FOR [facility] ONLY:   Pain Team Contact information: please page the Pain Team Via [employer]. Search \"Pain\". During daytime hours, please page the attending first. At night please page the resident first.    "

## 2023-02-16 NOTE — ANESTHESIA PREPROCEDURE EVALUATION
Anesthesia Pre-Procedure Evaluation    Patient: Cristina Sorto   MRN: [id number] : 1990        Procedure : Procedure(s):  REPEAT  SECTION          Past Medical History:   Diagnosis Date     Melanoma in situ (H) 2008    left buttock--excised completely      Migraine first trimester     Thyroid disease     hypothroidism      Past Surgical History:   Procedure Laterality Date      SECTION N/A 2020    Procedure:  SECTION;  Surgeon: Kristen Kessler MD;  Location:  L+D      No Known Allergies   Social History     Tobacco Use     Smoking status: Never     Smokeless tobacco: Never   Substance Use Topics     Alcohol use: Not Currently     Comment: occasionally      Wt Readings from Last 1 Encounters:   20 95.3 kg (210 lb)        Anesthesia Evaluation            ROS/MED HX  ENT/Pulmonary:    (-) asthma   Neurologic:  - neg neurologic ROS   (+) migraines,     Cardiovascular:    (-) PIH   METS/Exercise Tolerance: >4 METS    Hematologic:     (+) no anticoagulation therapy, no coagulopathy,     Musculoskeletal:       GI/Hepatic:     (+) GERD,     Renal/Genitourinary:       Endo:     (+) thyroid problem, hypothyroidism,     Psychiatric/Substance Use:       Infectious Disease:       Malignancy:       Other:            Physical Exam    Airway        Mallampati: III   TM distance: > 3 FB   Neck ROM: full     Respiratory Devices and Support         Dental  no notable dental history     (+) Completely normal teeth      Cardiovascular   cardiovascular exam normal          Pulmonary   pulmonary exam normal                OUTSIDE LABS:  CBC:   Lab Results   Component Value Date    WBC 8.3 2023    WBC 7.7 2023    HGB 12.4 2023    HGB 11.6 (L) 2023    HCT 38.4 2023    HCT 36.3 2023     2023     2023     BMP:   Lab Results   Component Value Date     2012    POTASSIUM 4.6 2012    CHLORIDE 105 2012    CO2  23 01/05/2012    BUN 14 01/05/2012    CR 0.85 01/05/2012    GLC 91 01/05/2012     COAGS: No results found for: PTT, INR, FIBR  POC:   Lab Results   Component Value Date    HCG Negative 08/30/2011     HEPATIC:   Lab Results   Component Value Date    ALBUMIN 4.3 01/05/2012    PROTTOTAL 8.0 01/05/2012    ALT 21 01/05/2012    AST 23 01/05/2012    ALKPHOS 52 01/05/2012    BILITOTAL 0.5 01/05/2012     OTHER:   Lab Results   Component Value Date    SURAJ 9.4 01/05/2012    TSH 3.06 05/22/2012    T4 0.75 01/05/2012    SED 19 04/11/2013       Anesthesia Plan    ASA Status:  2   NPO Status:  ELEVATED Aspiration Risk/Unknown    Anesthesia Type: Spinal.              Consents    Anesthesia Plan(s) and associated risks, benefits, and realistic alternatives discussed. Questions answered and patient/representative(s) expressed understanding.    - Discussed:     - Discussed with:  Patient         Postoperative Care    Pain management: intrathecal morphine.   PONV prophylaxis: Ondansetron (or other 5HT-3)     Comments:                Daniel Baird MD

## 2023-02-16 NOTE — PROGRESS NOTES
Pt is comfortable. States that ctx have decreased. Would like to re-evaluate around noon. Does not want oxytocin at this time. Dr. Kessler is aware. No orders at this time.

## 2023-02-16 NOTE — L&D DELIVERY NOTE
[facility]    OB Brief Operative Note    Pre-operative diagnosis: Failed trial of labor  Intrauterine pregnancy at 39 5/7 weeks gestation  Repeat  section  Prolonged Ruptured membranes, failure to enter spontaneous labor   Post-operative diagnosis Same   Procedure: Procedure(s):  REPEAT  SECTION   Surgeon: Kristen Kessler MD, MD   Assistants(s): Jennifer Schwab MD   Anesthesia: Spinal    Estimated blood loss: 655 ml    Total IV fluids: (See anesthesia record)  800ml   Blood transfusion: No transfusion was given during surgery   Total urine output: (See anesthesia record)  100ml   Specimens: None   Findings: Live   Male  Cephalic presentation:  left occiput transverse  APGARS: 1 minute: 8   5 minute: 9  Weight: 9 lbs 1 oz.  Placenta: Normal  Tubes: normal  Uterus: normal  Ovaries: normal   Complications: None   Condition: Infant stable, transferred to general care nursery  Mother stable, transfered to post-anesthesia recovery   Comments: See dictated operative report for full details [id number]

## 2023-02-16 NOTE — PROGRESS NOTES
OB    Patient noting mild increase in contractions but comfortable overall. Noting good FM    /68   Temp 98.6  F (37  C)   Resp 16     Cx exam deferred.  FHT baseline 130, moderate variability, accels.  Ctxns infrequent.     A/P: 33 yo  @ 39 5/7 weeks, PROM, previous c/s, not in labor  Reviewed with patient and  and current contraction pattern is not adequate. At this point, pitocin augmentation is indicated but carries increased risk due to previous c/s. She declines pitocin augmentation and would prefer to proceed with repeat c/s.   Consent signed.  Will proceed when OR available.  Kristen Kessler MD, MD on 2023 at 12:51 PM

## 2023-02-16 NOTE — PLAN OF CARE
Pt and spouse oriented to marcos RN at bedside discussing plan of care to include an update to Dr Campbell at 0300. Discussed possibility of repeat SVE at 0300, however pt states that even if her SVE is unchanged she would decline starting pitocin at that time in favor of waiting until later in the morning. Discussed whether pt would like to use aids like birthing ball/ step walking/ abdominal lift and tucks, etc. Since Cristina is not feeling any contractions at this time she would like to get some rest.

## 2023-02-16 NOTE — OP NOTE
Procedure Date: 2023    PREOPERATIVE DIAGNOSES:     1.  Intrauterine pregnancy at 39 weeks 5/7 days.  2.  Prolonged rupture of membranes with failure to enter spontaneous labor.  3.  Failed trial of labor.  4.  Repeat  section.    POSTOPERATIVE DIAGNOSES:    1.  Intrauterine pregnancy at 39 weeks 5/7 days.  2.  Prolonged rupture of membranes with failure to enter spontaneous labor.  3.  Failed trial of labor.  4.  Repeat  section.    PROCEDURE:  Repeat low transverse  section.    SURGEON:  Kristen Kessler MD    ASSISTANT:  Jennifer Schwab, M.D.    ANESTHESIA:  Spinal.    FLUIDS GIVEN:  800 mL lactated Ringer's.    URINE OUTPUT:  100 mL.    ESTIMATED BLOOD LOSS:  655 mL    SPECIMEN SENT:  None.    FINDINGS:  Live  male infant weighing 9 pounds 1 ounce in left occiput transverse presentation, Apgars 8 and 9.  Normal-appearing placenta.  Normal uterus, tubes, and ovaries with minimal scarring from the patient's previous  section.    COMPLICATIONS:  None.    CONDITION FROM THE OPERATING ROOM:  Stable.    CLINICAL NOTE:  The patient is a 32-year-old  2, para 1-0-0-1, who was originally scheduled for repeat  section on 2023 at 39 weeks and 6/7 days.  On late in the evening of 02/15 the patient presented to labor and delivery after spontaneous rupture of membranes at home.  She was not parul with any degree of regularity.  Fetal heart tracing was reassuring.  The patient's desire to observe for spontaneous labor, ruptured membranes occurred on 02/15 at approximately 7:10 p.m.  The patient presented to the hospital right around midnight.  She was continued to be observed.  On presentation, her cervix was only about 1-2 cm dilated.  At approximately 8:00 a.m. the following morning, the patient still was not parul.  Cervical exam at that time demonstrated the cervix to be a tight 2 cm dilated, soft posterior, -3 station, forebag was attempted to be  ruptured, but was difficult due to the fetal vertex not being well applied.  The patient was requesting continued observation for spontaneous labor.  At that point though it was discussed with her indication for Pitocin augmentation.  She did not want to have the Pitocin due to concern about risk for uterine rupture with previous  section.  So patient was continued to be observed until about noon.  At that time, she was not parul regularly and was meeting criteria for Pitocin augmentation.  Fetal heart tracing was reassuring throughout.  At that time, her situation was discussed and plan was made to proceed with repeat  section.  Consent form was signed for both the procedure and blood products after procedure was reviewed.    DESCRIPTION OF PROCEDURE:  The patient was taken to the operating room where she was placed on the appropriate spinal anesthesia.  She was then placed in the supine position where Ugalde catheter was placed to continuous drainage.  The patient was then prepped and draped in normal sterile fashion for an abdominal procedure.  Appropriate timeout was performed.  Her previous scar was identified.  A skin incision was made first, excising at her previous Pfannenstiel scar.  This incision was then extended down to the rectus fascia.  Rectus fascia was nicked in the midline and  bilaterally.  Rectus fascia was  off the rectus muscles with a combination of both sharp and blunt dissection.  Rectus muscles were  in the midline.  Peritoneum was then entered sharply.  Peritoneum was then extended inferiorly and superiorly with good visualization of the bladder and the lower uterine segment.  Bladder blade was placed.  The lower uterine segment was identified.  Bladder flap was created with a combination of both sharp and blunt dissection.  The lower uterine segment was then incised with a scalpel.  This incision was extended bluntly.  The fetal vertex was  then delivered out of the uterine incision.  The presentation was noted to be left occiput transverse.  Infant's head was delivered out of the uterine incision and then skin incision.  The remainder of the infant was then delivered.  This was a viable male infant weighing 9 pounds 1 ounce, Apgars 8 and 9.  The placenta was then delivered manually.  The uterus was exteriorized and cleared of all clots and debris.  The uterine incision was then reapproximated with 2 layers using 0 chromic.  The angles of the uterine incision were made hemostatic with several interrupted sutures of 0 chromic and 0 Vicryl.  Uterus was then replaced in the abdomen.  The gutters were cleared of all clots and debris.  The uterine incision was then reexamined and noted to be hemostatic.  The rectus fascia was reapproximated with a running 0 Vicryl.  Subcutaneous tissue was reapproximated with a running 3-0 plain.  Skin was closed in subcuticular fashion with 4-0 Vicryl and Steri-Strips were placed.  All sponge, lap and needle counts were correct x 2.  The patient was taken to recovery room in stable condition.  She was given both Ancef and azithromycin for prolonged rupture of membranes.    Kristen Kessler MD        D: 2023   T: 2023   MT: WU    Name:     RIRI ULRICH  MRN:      [id number]        Account:        [id number]   :      1990           Procedure Date: 2023     Document: [id number]

## 2023-02-16 NOTE — PROVIDER NOTIFICATION
Cristina is a  39+5 presenting with  Jose De Jesus for membrane assessment. She reports loss of clear fluid at 1910 tonight. She denies cramping/ contractions, endorses +FM. ROM+ positive, SVE 1-/-2.  She had a c/s her first pregnancy and would like to TOLAC. She would not like pitocn for augmentation at this time, would like to see if labor will progress naturally.       23 0007   Provider Notification   Provider Name/Title Dr Campbell   Method of Notification Phone   Request Evaluate - Remote   Notification Reason Patient Arrived;Membrane Status;Labor Status;Uterine Activity;Pain;Status Update;SVE   Report to Dr Campbell to include pt presence, hx, FHR, toco, SVE, pt desires for labor to not be augmented at this time. RN to update Dr Campbell between [id number].   Pt transferred stable and ambulatory to room 219

## 2023-02-16 NOTE — H&P
[facility]    History and Physical  Obstetrics and Gynecology     Date of Admission:  2/15/2023  Date of Service (when I saw the patient): 23    Assessment & Plan   Cristina Sorto is a 32 year old female who presents with SROM but no contractions. History complicated by previous c/s. She is scheduled for repeat c/s on .    ASSESSMENT:   IUP @ 39w5d ruptured with no labor.  NST reactive.  Category  I  Prenatal course was complicated by previous c/s.    PLAN:   Admit - see IP orders  Observation  MD consultant on call [place]/ jo ann prn    Kristen Kessler MD, MD  [facility] [place]      History of Present Illness   Cristina Sorto is a 32 year old female, , at 39w5d.  Her EDC is 2023, by Patient Reported.   No LMP recorded. Patient is pregnant.  [facility] with no labor.    Prenatal Course  Prenatal course was complicated by previous c/s.      Recent Labs   Lab Test 23  0018 20   ABO  --  A  A   RH  --  Pos  Pos   AS Negative Neg     Rhogam not indicated   Recent Labs   Lab Test 23  0000 20  0000 10/16/19  0000   HEPBANG  --   --  negative   HIAGAB  --   --  negative   GBS Negative   < >  --     < > = values in this interval not displayed.       OB History    Para Term  AB Living   2 1 1 0 0 1   SAB IAB Ectopic Multiple Live Births   0 0 0 0 1       Past Medical History    I have reviewed this patient's medical history and updated it with pertinent information if needed.   Past Medical History:   Diagnosis Date     Melanoma in situ (H) 2008    left buttock--excised completely      Migraine first trimester     Thyroid disease     hypothroidism       Past Surgical History   I have reviewed this patient's surgical history and updated it with pertinent information if needed.  Past Surgical History:   Procedure Laterality Date      SECTION N/A 2020    Procedure:   SECTION;  Surgeon: Kristen Kessler MD;  Location:  L+D       Prior to Admission Medications   Prior to Admission Medications   Prescriptions Last Dose Informant Patient Reported? Taking?   Prenatal Vit-Fe Fumarate-FA (PNV PRENATAL PLUS MULTIVITAMIN) 27-1 MG TABS per tablet 2/15/2023  Yes Yes   Sig: Take 1 tablet by mouth daily   SUMAtriptan (IMITREX) 50 MG tablet More than a month  No Yes   Si at start of migraine ,may repeat in 1 hr   ketoconazole (NIZORAL) 2 % external cream Past Week  Yes Yes   Sig: Apply topically daily   levothyroxine (SYNTHROID/LEVOTHROID) 88 MCG tablet 2/15/2023  Yes Yes   Si mcg      Facility-Administered Medications: None     Allergies   No Known Allergies    Social History   I have reviewed this patient's social history and updated it with pertinent information if needed. Cristina Sorto  reports that she has never smoked. She has never used smokeless tobacco. She reports that she does not currently use alcohol. She reports that she does not use drugs.    Family History   I have reviewed this patient's family history and updated it with pertinent information if needed.   Family History   Problem Relation Age of Onset     Neurologic Disorder Sister      Melanoma Mother      Arthritis Father         gout     Lupus Sister        Immunization History   Immunizations are up to date    Physical Exam     Patient Vitals for the past 24 hrs:   BP Temp Temp src Resp   23 0723 -- 97.9  F (36.6  C) -- --   23 0638 110/72 97.5  F (36.4  C) Temporal 16   23 0430 -- 97.3  F (36.3  C) -- --   23 0305 -- 98.8  F (37.1  C) -- --   23 0100 116/67 98.4  F (36.9  C) Temporal 15   02/15/23 2332 114/71 99.2  F (37.3  C) Temporal 16       Abdomen: gravid, single vertex fetus, non-tender, EFW 8 lbs   Cervical Exam: / Posterior/ soft/ -3  Cx exam had been 1-2 on admission.    Fetal Heart Tones: 130 baseline, moderate variablility, + accels, no decels and  Category I  TOCO:   external monitor and irregular    Constitutional: healthy, alert, active and no distress   Respiratory: No increased work of breathing, good air exchange, clear to auscultation bilaterally, no crackles or wheezing  Cardiovascular: regular rate and rhythm  Skin/Extremites: no bruising or bleeding  Neurologic: Awake, alert, oriented to name, place and time.  Cranial nerves II-XII are grossly intact.  Motor is 5 out of 5 bilaterally.  Cerebellar finger to nose, heel to shin intact.  Sensory is intact.  Babinski down going, Romberg negative, and gait is normal.  Neuropsychiatric: General: normal, calm and normal eye contact

## 2023-02-16 NOTE — PROGRESS NOTES
OB     Patient is comfortable this am, not having a lot of ctxns    /72   Temp 97.9  F (36.6  C)   Resp 16     Cx: tight /-3, soft, posterior  Forebag attempted AROM-small amount of fluid noted  FHT baseline 130, moderate variability, accels  Ctxns: irregular    A/P: 31 yo  @ 39 5/7 weeks, history of previous c/s, desiring TOLAC  Patient now with prolonged ROM since about 7 pm last night-no active labor.  AROM of forebag attempted now.  Patient encouraged to ambulate now.  Discussed potential risks of starting pitocin for augmentation of labor if no regular contractions. Patient would like to see what happens over the next few hours before considering pitocin.   Discussed that if pitocin augmentation is declined, repeat c/s would be advised. She is satisfied with that plan.  Kristen Kessler MD, MD on 2023 at 8:44 AM

## 2023-02-17 LAB — HGB BLD-MCNC: 8.8 G/DL (ref 11.7–15.7)

## 2023-02-17 PROCEDURE — 250N000013 HC RX MED GY IP 250 OP 250 PS 637: Performed by: OBSTETRICS & GYNECOLOGY

## 2023-02-17 PROCEDURE — 36415 COLL VENOUS BLD VENIPUNCTURE: CPT | Performed by: OBSTETRICS & GYNECOLOGY

## 2023-02-17 PROCEDURE — 85018 HEMOGLOBIN: CPT | Performed by: OBSTETRICS & GYNECOLOGY

## 2023-02-17 PROCEDURE — 250N000011 HC RX IP 250 OP 636: Performed by: OBSTETRICS & GYNECOLOGY

## 2023-02-17 PROCEDURE — 120N000012 HC R&B POSTPARTUM

## 2023-02-17 RX ORDER — FERROUS SULFATE 325(65) MG
325 TABLET ORAL DAILY
Status: DISCONTINUED | OUTPATIENT
Start: 2023-02-17 | End: 2023-02-19 | Stop reason: HOSPADM

## 2023-02-17 RX ADMIN — FERROUS SULFATE TAB 325 MG (65 MG ELEMENTAL FE) 325 MG: 325 (65 FE) TAB at 15:04

## 2023-02-17 RX ADMIN — ACETAMINOPHEN 1000 MG: 500 TABLET ORAL at 07:13

## 2023-02-17 RX ADMIN — OXYCODONE HYDROCHLORIDE 5 MG: 5 TABLET ORAL at 17:17

## 2023-02-17 RX ADMIN — ACETAMINOPHEN 1000 MG: 500 TABLET ORAL at 20:07

## 2023-02-17 RX ADMIN — LEVOTHYROXINE SODIUM 68.5 MCG: 112 TABLET ORAL at 08:21

## 2023-02-17 RX ADMIN — ACETAMINOPHEN 1000 MG: 500 TABLET ORAL at 14:23

## 2023-02-17 RX ADMIN — KETOROLAC TROMETHAMINE 30 MG: 30 INJECTION, SOLUTION INTRAMUSCULAR at 05:12

## 2023-02-17 RX ADMIN — IBUPROFEN 600 MG: 600 TABLET ORAL at 16:38

## 2023-02-17 RX ADMIN — IBUPROFEN 600 MG: 600 TABLET ORAL at 23:02

## 2023-02-17 RX ADMIN — KETOROLAC TROMETHAMINE 30 MG: 30 INJECTION, SOLUTION INTRAMUSCULAR at 11:05

## 2023-02-17 RX ADMIN — ACETAMINOPHEN 1000 MG: 500 TABLET ORAL at 01:16

## 2023-02-17 RX ADMIN — SENNOSIDES AND DOCUSATE SODIUM 1 TABLET: 50; 8.6 TABLET ORAL at 08:21

## 2023-02-17 RX ADMIN — SENNOSIDES AND DOCUSATE SODIUM 1 TABLET: 50; 8.6 TABLET ORAL at 20:06

## 2023-02-17 ASSESSMENT — ACTIVITIES OF DAILY LIVING (ADL)
ADLS_ACUITY_SCORE: 24
ADLS_ACUITY_SCORE: 20
ADLS_ACUITY_SCORE: 20
ADLS_ACUITY_SCORE: 24
ADLS_ACUITY_SCORE: 24
ADLS_ACUITY_SCORE: 20
ADLS_ACUITY_SCORE: 24
ADLS_ACUITY_SCORE: 20
ADLS_ACUITY_SCORE: 20
ADLS_ACUITY_SCORE: 24

## 2023-02-17 NOTE — PLAN OF CARE
Vital signs stable, occasional softer BP's but asymptomatic. Postpartum assessment WDL. Incision UTV, dressing marked. Pain controlled with  tylenol and toradol. Patient ambulating with stand by assist. Ugalde out ar 2300, voided 300 at 0500.Encouraged frequent voiding. Patient denies passing gas. Breastfeeding on cue with minimal assist. Patient and infant bonding well. Will continue with current plan of care.

## 2023-02-17 NOTE — PLAN OF CARE
Patient's vital signs are stable.  She is tolerating diet, ambulating and caring for the baby independently.  Breastfeeding is going well and mother is trying to help baby achieve a deeper latch.  Mother is moving very well and spouse is helping as needed.  Adequate pain control with oral pain medications.  Mom will take oxycodone as needed and Tylenol and Ibuprofen when due.   Incision is clean, dry and intact.  Lochia is WNL.

## 2023-02-17 NOTE — PLAN OF CARE
Vitals stable. Feels well. Denies pain. Ambulating in room. Voiding in good amounts. Breast feeding without difficulty.   Will continue to monitor.

## 2023-02-17 NOTE — PLAN OF CARE
Data: Cristina Sorto transferred to 404 via cart at 1800. Baby transferred via parent's arms.  Action: Receiving unit notified of transfer: Yes. Patient and family notified of room change. Report given to Mackenzie MATA at 1800. Belongings sent to receiving unit. Accompanied by Registered Nurse. Oriented patient to surroundings. Call light within reach. ID bands double-checked with receiving RN.  Response: Patient tolerated transfer and is stable.

## 2023-02-17 NOTE — PROGRESS NOTES
[facility]    Post-Partum Progress Note   Obstetrics and Gynecology    Assessment & Plan     ASSESSMENT:  -1 Day Post-Op  Procedure(s):  REPEAT  SECTION  Prenatal course was essentially uncomplicated  Doing well.  Clean wound without signs of infection.  Normal healing wound.  No immediate surgical complications identified.  No excessive bleeding  Pain well-controlled.    PLAN:  Ambulation encouraged  Breast feeding strategies discussed  Pain control measures as needed  Anticipate discharge in 1-2 days    Kristen Kessler MD, MD  [facility] [place]      Interval History   The baby is well.  No problems with feeding.  Pain is adequately controlled. No immediate concerns identified.      Physical Exam   B/P: 96/55, T: 98.5, P: 71, R: 18    Uterine fundus is firm, non-tender and at the level of the umbilicus  Incision C/D/I    Data   Recent Labs   Lab Test 23  0018 20   ABO  --  A  A   RH  --  Pos  Pos   AS Negative Neg     Recent Labs   Lab Test 23  1230 23  0018   HGB 12.4 11.6*     No lab results found.

## 2023-02-17 NOTE — PLAN OF CARE
Patient transferred from PACU at 1800. Report taken from Hafsa MATA. Safety and security, and education reviewed. Baby bands checked. VSS. Fundus firm and midline. Scant flow., pain controlled with tylenol Breastfeeding. hernandez draining . Encouraged to call with needs, questions, or concerns. Will continue to monitor.

## 2023-02-17 NOTE — PROVIDER NOTIFICATION
02/17/23 0606   Provider Notification   Provider Name/Title Dr. Carey   Method of Notification Phone   Request Evaluate-Remote   Notification Reason Medication Request     Patient requested synthroid dose be cut in half per her endocrinologist. BARRY rose

## 2023-02-18 PROCEDURE — 120N000012 HC R&B POSTPARTUM

## 2023-02-18 PROCEDURE — 250N000013 HC RX MED GY IP 250 OP 250 PS 637: Performed by: OBSTETRICS & GYNECOLOGY

## 2023-02-18 RX ORDER — MODIFIED LANOLIN
OINTMENT (GRAM) TOPICAL
Qty: 40 G | Refills: 0 | Status: SHIPPED | OUTPATIENT
Start: 2023-02-18

## 2023-02-18 RX ORDER — AMOXICILLIN 250 MG
1 CAPSULE ORAL 2 TIMES DAILY
Qty: 60 TABLET | Refills: 0 | Status: SHIPPED | OUTPATIENT
Start: 2023-02-18

## 2023-02-18 RX ORDER — OXYCODONE HYDROCHLORIDE 5 MG/1
5 TABLET ORAL EVERY 4 HOURS PRN
Qty: 20 TABLET | Refills: 0 | Status: SHIPPED | OUTPATIENT
Start: 2023-02-18

## 2023-02-18 RX ORDER — FERROUS SULFATE 325(65) MG
325 TABLET ORAL DAILY
Qty: 90 TABLET | Refills: 0 | Status: SHIPPED | OUTPATIENT
Start: 2023-02-18

## 2023-02-18 RX ORDER — ACETAMINOPHEN 500 MG
1000 TABLET ORAL EVERY 6 HOURS
Qty: 90 TABLET | Refills: 0 | Status: SHIPPED | OUTPATIENT
Start: 2023-02-18

## 2023-02-18 RX ORDER — IBUPROFEN 600 MG/1
600 TABLET, FILM COATED ORAL EVERY 6 HOURS
Qty: 90 TABLET | Refills: 0 | Status: SHIPPED | OUTPATIENT
Start: 2023-02-18

## 2023-02-18 RX ADMIN — ACETAMINOPHEN 1000 MG: 500 TABLET ORAL at 10:31

## 2023-02-18 RX ADMIN — IBUPROFEN 600 MG: 600 TABLET ORAL at 13:50

## 2023-02-18 RX ADMIN — ACETAMINOPHEN 1000 MG: 500 TABLET ORAL at 22:20

## 2023-02-18 RX ADMIN — OXYCODONE HYDROCHLORIDE 5 MG: 5 TABLET ORAL at 19:21

## 2023-02-18 RX ADMIN — IBUPROFEN 600 MG: 600 TABLET ORAL at 19:20

## 2023-02-18 RX ADMIN — IBUPROFEN 600 MG: 600 TABLET ORAL at 07:48

## 2023-02-18 RX ADMIN — OXYCODONE HYDROCHLORIDE 5 MG: 5 TABLET ORAL at 04:30

## 2023-02-18 RX ADMIN — FERROUS SULFATE TAB 325 MG (65 MG ELEMENTAL FE) 325 MG: 325 (65 FE) TAB at 08:35

## 2023-02-18 RX ADMIN — SENNOSIDES AND DOCUSATE SODIUM 1 TABLET: 50; 8.6 TABLET ORAL at 07:47

## 2023-02-18 RX ADMIN — SENNOSIDES AND DOCUSATE SODIUM 1 TABLET: 50; 8.6 TABLET ORAL at 19:20

## 2023-02-18 RX ADMIN — LEVOTHYROXINE SODIUM 56 MCG: 112 TABLET ORAL at 07:46

## 2023-02-18 RX ADMIN — ACETAMINOPHEN 1000 MG: 500 TABLET ORAL at 04:00

## 2023-02-18 RX ADMIN — ACETAMINOPHEN 1000 MG: 500 TABLET ORAL at 16:32

## 2023-02-18 ASSESSMENT — ACTIVITIES OF DAILY LIVING (ADL)
ADLS_ACUITY_SCORE: 20

## 2023-02-18 NOTE — PLAN OF CARE
Vitally stable. Pain managed with PO meds. Breastfeeding well. Bonding well with baby. Abdominal incision CDI. Fundus firm, midline.

## 2023-02-18 NOTE — PROGRESS NOTES
[facility]    Post-Partum Progress Note   Obstetrics and Gynecology    Assessment & Plan     ASSESSMENT:  -2 Days Post-Op  Procedure(s):  REPEAT  SECTION  Prenatal course was essentially uncomplicated  Doing well.  Clean wound without signs of infection.  Normal healing wound.  No immediate surgical complications identified.  No excessive bleeding  Pain well-controlled.    PLAN:  Ambulation encouraged  Breast feeding strategies discussed  Pain control measures as needed  Oral Fe started yesterday.  Possible discharge home this evening if desired by patient.    Kristen Kessler MD, MD  [facility]      Interval History   The baby is well.  No problems with feeding.  Pain is adequately controlled. No immediate concerns identified.      Physical Exam   B/P: 101/62, T: 97.8, P: 80, R: 16    Uterine fundus is firm, non-tender and at the level of the umbilicus  Incision C/D/I    Data   Recent Labs   Lab Test 23  0018 20   ABO  --  A  A   RH  --  Pos  Pos   AS Negative Neg     Recent Labs   Lab Test 23  1049 23  1230   HGB 8.8* 12.4     No lab results found.

## 2023-02-18 NOTE — PLAN OF CARE
Pt's pain controlled with Ibuprofen and Tylenol and Oxycodone.  Up and about in room and walking in the hallways. Abdominal binder on. Will continue to monitor.

## 2023-02-18 NOTE — PLAN OF CARE
Fundus firm and bleeding wnl.  VSS.  Incision clean, dry and intact.  Rates pain 4/10 and taking tylenol and ibuprofen with good relief.  Up independently.  Using abdominal binder.  Passing flatus and tolerating regular diet.  Encouraged to call with questions or concerns.

## 2023-02-19 VITALS
HEART RATE: 93 BPM | RESPIRATION RATE: 16 BRPM | DIASTOLIC BLOOD PRESSURE: 77 MMHG | SYSTOLIC BLOOD PRESSURE: 122 MMHG | TEMPERATURE: 97.9 F | OXYGEN SATURATION: 100 %

## 2023-02-19 PROCEDURE — 250N000013 HC RX MED GY IP 250 OP 250 PS 637: Performed by: OBSTETRICS & GYNECOLOGY

## 2023-02-19 RX ADMIN — ACETAMINOPHEN 1000 MG: 500 TABLET ORAL at 04:47

## 2023-02-19 RX ADMIN — IBUPROFEN 600 MG: 600 TABLET ORAL at 07:46

## 2023-02-19 RX ADMIN — IBUPROFEN 600 MG: 600 TABLET ORAL at 01:50

## 2023-02-19 RX ADMIN — LEVOTHYROXINE SODIUM 56 MCG: 112 TABLET ORAL at 07:45

## 2023-02-19 RX ADMIN — SENNOSIDES AND DOCUSATE SODIUM 1 TABLET: 50; 8.6 TABLET ORAL at 07:46

## 2023-02-19 RX ADMIN — OXYCODONE HYDROCHLORIDE 5 MG: 5 TABLET ORAL at 01:50

## 2023-02-19 RX ADMIN — ACETAMINOPHEN 1000 MG: 500 TABLET ORAL at 10:33

## 2023-02-19 RX ADMIN — FERROUS SULFATE TAB 325 MG (65 MG ELEMENTAL FE) 325 MG: 325 (65 FE) TAB at 08:56

## 2023-02-19 ASSESSMENT — ACTIVITIES OF DAILY LIVING (ADL)
ADLS_ACUITY_SCORE: 20

## 2023-02-19 NOTE — LACTATION NOTE
Lactation check-in prior to discharge. Cristina shares that breastfeeding is going well and her milk came in yesterday. She denies having any questions or concerns; declines need for lactation support.    Eileen Larsen RN, IBCLC

## 2023-02-19 NOTE — PROGRESS NOTES
POD 3    +flatus  Voiding freely    Afebrile  Normotensive    abd appropriately tender  Incision dry / intact  Ext nt    Rh positive  Rubella immune    POD 1 hgb 8.8 (consistent with acute blood loss anemia)    A/p    POD 3  RCS February 16; delivery time 1500    Stable for discharge to home    rxs as provided by Dr Kessler yesterday    Instructions reviewed    Geovany PATEL

## 2023-02-19 NOTE — PLAN OF CARE
Pt's pain controlled with Oxycodone/Ibuprofen/Tylenol. Up and about in room and walking in the hallways. Abdominal binder on. Going home today with .

## 2023-02-19 NOTE — PLAN OF CARE
Vitally stable. Breastfeeding well. Milk is in! Bonding well with baby. Due to stool, a few attempts overnight. Supportive spouse at bedside.

## 2023-03-26 ENCOUNTER — HEALTH MAINTENANCE LETTER (OUTPATIENT)
Age: 33
End: 2023-03-26

## 2024-06-01 ENCOUNTER — HEALTH MAINTENANCE LETTER (OUTPATIENT)
Age: 34
End: 2024-06-01

## 2025-06-14 ENCOUNTER — HEALTH MAINTENANCE LETTER (OUTPATIENT)
Age: 35
End: 2025-06-14

## (undated) DEVICE — SU VICRYL 0 CT 36" J358H

## (undated) DEVICE — PREP CHLORAPREP 26ML TINTED ORANGE  260815

## (undated) DEVICE — CATH TRAY FOLEY 16FR BARDEX W/DRAIN BAG STATLOCK 300316A

## (undated) DEVICE — SU PLAIN 2-0 CT 27" 853H

## (undated) DEVICE — LINEN C-SECTION 5415

## (undated) DEVICE — BLADE CLIPPER 4406

## (undated) DEVICE — PACK C-SECTION LF PL15OTA83B

## (undated) DEVICE — SU CHROMIC 1 CTX 36" 905H

## (undated) DEVICE — ESU GROUND PAD UNIVERSAL W/O CORD

## (undated) DEVICE — SOL NACL 0.9% IRRIG 1000ML BOTTLE 07138-09

## (undated) DEVICE — GLOVE PROTEXIS W/NEU-THERA 7.0  2D73TE70

## (undated) DEVICE — SUCTION CANISTER MEDIVAC LINER 3000ML W/LID 65651-530

## (undated) DEVICE — GLOVE PROTEXIS W/NEU-THERA 6.5  2D73TE65

## (undated) RX ORDER — MORPHINE SULFATE 1 MG/ML
INJECTION, SOLUTION EPIDURAL; INTRATHECAL; INTRAVENOUS
Status: DISPENSED
Start: 2020-06-06

## (undated) RX ORDER — FENTANYL CITRATE 50 UG/ML
INJECTION, SOLUTION INTRAMUSCULAR; INTRAVENOUS
Status: DISPENSED
Start: 2023-02-16

## (undated) RX ORDER — OXYTOCIN/0.9 % SODIUM CHLORIDE 30/500 ML
PLASTIC BAG, INJECTION (ML) INTRAVENOUS
Status: DISPENSED
Start: 2020-06-06

## (undated) RX ORDER — LIDOCAINE HCL/EPINEPHRINE/PF 2%-1:200K
VIAL (ML) INJECTION
Status: DISPENSED
Start: 2020-06-06

## (undated) RX ORDER — MORPHINE SULFATE 1 MG/ML
INJECTION, SOLUTION EPIDURAL; INTRATHECAL; INTRAVENOUS
Status: DISPENSED
Start: 2023-02-16

## (undated) RX ORDER — OXYTOCIN/0.9 % SODIUM CHLORIDE 30/500 ML
PLASTIC BAG, INJECTION (ML) INTRAVENOUS
Status: DISPENSED
Start: 2023-02-16